# Patient Record
Sex: FEMALE | Race: BLACK OR AFRICAN AMERICAN | NOT HISPANIC OR LATINO | Employment: UNEMPLOYED | URBAN - METROPOLITAN AREA
[De-identification: names, ages, dates, MRNs, and addresses within clinical notes are randomized per-mention and may not be internally consistent; named-entity substitution may affect disease eponyms.]

---

## 2017-01-31 ENCOUNTER — HISTORICAL (OUTPATIENT)
Dept: INTERNAL MEDICINE | Facility: CLINIC | Age: 52
End: 2017-01-31

## 2017-05-08 ENCOUNTER — HISTORICAL (OUTPATIENT)
Dept: ADMINISTRATIVE | Facility: HOSPITAL | Age: 52
End: 2017-05-08

## 2017-08-22 ENCOUNTER — HISTORICAL (OUTPATIENT)
Dept: INTERNAL MEDICINE | Facility: CLINIC | Age: 52
End: 2017-08-22

## 2017-08-22 LAB
ABS NEUT (OLG): 1.89 X10(3)/MCL (ref 2.1–9.2)
ALBUMIN SERPL-MCNC: 3.8 GM/DL (ref 3.4–5)
ALBUMIN/GLOB SERPL: 1 RATIO (ref 1–2)
ALP SERPL-CCNC: 58 UNIT/L (ref 45–117)
ALT SERPL-CCNC: 32 UNIT/L (ref 12–78)
AST SERPL-CCNC: 21 UNIT/L (ref 15–37)
BASOPHILS # BLD AUTO: 0.02 X10(3)/MCL
BASOPHILS NFR BLD AUTO: 0 % (ref 0–1)
BILIRUB SERPL-MCNC: 0.3 MG/DL (ref 0.2–1)
BILIRUBIN DIRECT+TOT PNL SERPL-MCNC: 0.1 MG/DL
BILIRUBIN DIRECT+TOT PNL SERPL-MCNC: 0.2 MG/DL
BUN SERPL-MCNC: 9 MG/DL (ref 7–18)
CALCIUM SERPL-MCNC: 9.4 MG/DL (ref 8.5–10.1)
CHLORIDE SERPL-SCNC: 107 MMOL/L (ref 98–107)
CHOLEST SERPL-MCNC: 131 MG/DL
CHOLEST/HDLC SERPL: 2.6 {RATIO} (ref 0–4.4)
CO2 SERPL-SCNC: 31 MMOL/L (ref 21–32)
CREAT SERPL-MCNC: 0.8 MG/DL (ref 0.6–1.3)
EOSINOPHIL # BLD AUTO: 0.1 10*3/UL
EOSINOPHIL NFR BLD AUTO: 2 % (ref 0–5)
ERYTHROCYTE [DISTWIDTH] IN BLOOD BY AUTOMATED COUNT: 12.6 % (ref 11.5–14.5)
EST. AVERAGE GLUCOSE BLD GHB EST-MCNC: 146 MG/DL
GLOBULIN SER-MCNC: 2.9 GM/ML (ref 2.3–3.5)
GLUCOSE SERPL-MCNC: 142 MG/DL (ref 74–106)
HBA1C MFR BLD: 6.7 % (ref 4.2–6.3)
HCT VFR BLD AUTO: 39.8 % (ref 35–46)
HDLC SERPL-MCNC: 51 MG/DL
HGB BLD-MCNC: 13 GM/DL (ref 12–16)
LDLC SERPL CALC-MCNC: 68 MG/DL (ref 0–130)
LYMPHOCYTES # BLD AUTO: 1.81 X10(3)/MCL
LYMPHOCYTES NFR BLD AUTO: 44 % (ref 15–40)
MCH RBC QN AUTO: 29.7 PG (ref 26–34)
MCHC RBC AUTO-ENTMCNC: 32.7 GM/DL (ref 31–37)
MCV RBC AUTO: 90.9 FL (ref 80–100)
MONOCYTES # BLD AUTO: 0.32 X10(3)/MCL
MONOCYTES NFR BLD AUTO: 8 % (ref 4–12)
NEUTROPHILS # BLD AUTO: 1.89 X10(3)/MCL
NEUTROPHILS NFR BLD AUTO: 46 X10(3)/MCL
PLATELET # BLD AUTO: 150 X10(3)/MCL (ref 130–400)
PMV BLD AUTO: 11.1 FL (ref 7.4–10.4)
POTASSIUM SERPL-SCNC: 3.8 MMOL/L (ref 3.5–5.1)
PROT SERPL-MCNC: 6.7 GM/DL (ref 6.4–8.2)
RBC # BLD AUTO: 4.38 X10(6)/MCL (ref 4–5.2)
SODIUM SERPL-SCNC: 143 MMOL/L (ref 136–145)
TRIGL SERPL-MCNC: 61 MG/DL
TSH SERPL-ACNC: 1.4 MIU/L (ref 0.36–3.74)
VLDLC SERPL CALC-MCNC: 12 MG/DL
WBC # SPEC AUTO: 4.1 X10(3)/MCL (ref 4.5–11)

## 2018-02-26 ENCOUNTER — HISTORICAL (OUTPATIENT)
Dept: INTERNAL MEDICINE | Facility: CLINIC | Age: 53
End: 2018-02-26

## 2018-02-26 LAB
ABS NEUT (OLG): 2.31 X10(3)/MCL (ref 2.1–9.2)
ALBUMIN SERPL-MCNC: 3.7 GM/DL (ref 3.4–5)
ALBUMIN/GLOB SERPL: 1 RATIO (ref 1–2)
ALP SERPL-CCNC: 56 UNIT/L (ref 45–117)
ALT SERPL-CCNC: 27 UNIT/L (ref 12–78)
AST SERPL-CCNC: 24 UNIT/L (ref 15–37)
BASOPHILS # BLD AUTO: 0.02 X10(3)/MCL
BASOPHILS NFR BLD AUTO: 0 %
BILIRUB SERPL-MCNC: 0.4 MG/DL (ref 0.2–1)
BILIRUBIN DIRECT+TOT PNL SERPL-MCNC: 0.1 MG/DL
BILIRUBIN DIRECT+TOT PNL SERPL-MCNC: 0.3 MG/DL
BUN SERPL-MCNC: 12 MG/DL (ref 7–18)
CALCIUM SERPL-MCNC: 9.3 MG/DL (ref 8.5–10.1)
CHLORIDE SERPL-SCNC: 106 MMOL/L (ref 98–107)
CHOLEST SERPL-MCNC: 122 MG/DL
CHOLEST/HDLC SERPL: 2.7 {RATIO} (ref 0–4.4)
CO2 SERPL-SCNC: 32 MMOL/L (ref 21–32)
CREAT SERPL-MCNC: 0.8 MG/DL (ref 0.6–1.3)
EOSINOPHIL # BLD AUTO: 0.14 X10(3)/MCL
EOSINOPHIL NFR BLD AUTO: 3 %
ERYTHROCYTE [DISTWIDTH] IN BLOOD BY AUTOMATED COUNT: 12.4 % (ref 11.5–14.5)
EST. AVERAGE GLUCOSE BLD GHB EST-MCNC: 154 MG/DL
GLOBULIN SER-MCNC: 2.9 GM/ML (ref 2.3–3.5)
GLUCOSE SERPL-MCNC: 148 MG/DL (ref 74–106)
HBA1C MFR BLD: 7 % (ref 4.2–6.3)
HCT VFR BLD AUTO: 37.3 % (ref 35–46)
HDLC SERPL-MCNC: 45 MG/DL
HGB BLD-MCNC: 12.2 GM/DL (ref 12–16)
IMM GRANULOCYTES # BLD AUTO: 0.01 10*3/UL
IMM GRANULOCYTES NFR BLD AUTO: 0 %
LDLC SERPL CALC-MCNC: 69 MG/DL (ref 0–130)
LYMPHOCYTES # BLD AUTO: 2.2 X10(3)/MCL
LYMPHOCYTES NFR BLD AUTO: 44 % (ref 13–40)
MCH RBC QN AUTO: 29.6 PG (ref 26–34)
MCHC RBC AUTO-ENTMCNC: 32.7 GM/DL (ref 31–37)
MCV RBC AUTO: 90.5 FL (ref 80–100)
MONOCYTES # BLD AUTO: 0.38 X10(3)/MCL
MONOCYTES NFR BLD AUTO: 8 % (ref 4–12)
NEUTROPHILS # BLD AUTO: 2.31 X10(3)/MCL
NEUTROPHILS NFR BLD AUTO: 46 X10(3)/MCL
PLATELET # BLD AUTO: 162 X10(3)/MCL (ref 130–400)
PMV BLD AUTO: 11.6 FL (ref 7.4–10.4)
POTASSIUM SERPL-SCNC: 3.1 MMOL/L (ref 3.5–5.1)
PROT SERPL-MCNC: 6.6 GM/DL (ref 6.4–8.2)
RBC # BLD AUTO: 4.12 X10(6)/MCL (ref 4–5.2)
SODIUM SERPL-SCNC: 144 MMOL/L (ref 136–145)
TRIGL SERPL-MCNC: 40 MG/DL
TSH SERPL-ACNC: 1.2 MIU/L (ref 0.36–3.74)
VLDLC SERPL CALC-MCNC: 8 MG/DL
WBC # SPEC AUTO: 5.1 X10(3)/MCL (ref 4.5–11)

## 2018-03-05 ENCOUNTER — HISTORICAL (OUTPATIENT)
Dept: RADIOLOGY | Facility: HOSPITAL | Age: 53
End: 2018-03-05

## 2018-08-29 ENCOUNTER — HISTORICAL (OUTPATIENT)
Dept: INTERNAL MEDICINE | Facility: CLINIC | Age: 53
End: 2018-08-29

## 2018-08-29 LAB
ABS NEUT (OLG): 2.28 X10(3)/MCL (ref 2.1–9.2)
ALBUMIN SERPL-MCNC: 3.9 GM/DL (ref 3.4–5)
ALBUMIN/GLOB SERPL: 1 RATIO (ref 1–2)
ALP SERPL-CCNC: 56 UNIT/L (ref 45–117)
ALT SERPL-CCNC: 25 UNIT/L (ref 12–78)
AST SERPL-CCNC: 17 UNIT/L (ref 15–37)
BASOPHILS # BLD AUTO: 0.02 X10(3)/MCL
BASOPHILS NFR BLD AUTO: 0 %
BILIRUB SERPL-MCNC: 0.3 MG/DL (ref 0.2–1)
BILIRUBIN DIRECT+TOT PNL SERPL-MCNC: 0.1 MG/DL
BILIRUBIN DIRECT+TOT PNL SERPL-MCNC: 0.2 MG/DL
BUN SERPL-MCNC: 12 MG/DL (ref 7–18)
CALCIUM SERPL-MCNC: 9.3 MG/DL (ref 8.5–10.1)
CHLORIDE SERPL-SCNC: 106 MMOL/L (ref 98–107)
CHOLEST SERPL-MCNC: 132 MG/DL
CHOLEST/HDLC SERPL: 3 {RATIO} (ref 0–4.4)
CO2 SERPL-SCNC: 29 MMOL/L (ref 21–32)
CREAT SERPL-MCNC: 0.8 MG/DL (ref 0.6–1.3)
CREAT UR-MCNC: 266 MG/DL
EOSINOPHIL # BLD AUTO: 0.12 10*3/UL
EOSINOPHIL NFR BLD AUTO: 2 %
ERYTHROCYTE [DISTWIDTH] IN BLOOD BY AUTOMATED COUNT: 12.7 % (ref 11.5–14.5)
EST. AVERAGE GLUCOSE BLD GHB EST-MCNC: 140 MG/DL
GLOBULIN SER-MCNC: 3 GM/ML (ref 2.3–3.5)
GLUCOSE SERPL-MCNC: 144 MG/DL (ref 74–106)
HBA1C MFR BLD: 6.5 % (ref 4.2–6.3)
HCT VFR BLD AUTO: 40.5 % (ref 35–46)
HDLC SERPL-MCNC: 44 MG/DL
HGB BLD-MCNC: 13.4 GM/DL (ref 12–16)
IMM GRANULOCYTES # BLD AUTO: 0.01 10*3/UL
IMM GRANULOCYTES NFR BLD AUTO: 0 %
LDLC SERPL CALC-MCNC: 75 MG/DL (ref 0–130)
LYMPHOCYTES # BLD AUTO: 1.99 X10(3)/MCL
LYMPHOCYTES NFR BLD AUTO: 42 % (ref 13–40)
MCH RBC QN AUTO: 29.9 PG (ref 26–34)
MCHC RBC AUTO-ENTMCNC: 33.1 GM/DL (ref 31–37)
MCV RBC AUTO: 90.4 FL (ref 80–100)
MICROALBUMIN UR-MCNC: 14 MG/L (ref 0–19)
MICROALBUMIN/CREAT RATIO PNL UR: 5.3 MCG/MG CR (ref 0–29)
MONOCYTES # BLD AUTO: 0.34 X10(3)/MCL
MONOCYTES NFR BLD AUTO: 7 % (ref 4–12)
NEUTROPHILS # BLD AUTO: 2.28 X10(3)/MCL
NEUTROPHILS NFR BLD AUTO: 48 X10(3)/MCL
PLATELET # BLD AUTO: 154 X10(3)/MCL (ref 130–400)
PMV BLD AUTO: 11.6 FL (ref 7.4–10.4)
POTASSIUM SERPL-SCNC: 3.7 MMOL/L (ref 3.5–5.1)
PROT SERPL-MCNC: 6.9 GM/DL (ref 6.4–8.2)
RBC # BLD AUTO: 4.48 X10(6)/MCL (ref 4–5.2)
SODIUM SERPL-SCNC: 142 MMOL/L (ref 136–145)
TRIGL SERPL-MCNC: 63 MG/DL
TSH SERPL-ACNC: 1.95 MIU/L (ref 0.36–3.74)
VLDLC SERPL CALC-MCNC: 13 MG/DL
WBC # SPEC AUTO: 4.8 X10(3)/MCL (ref 4.5–11)

## 2019-03-26 ENCOUNTER — HISTORICAL (OUTPATIENT)
Dept: INTERNAL MEDICINE | Facility: CLINIC | Age: 54
End: 2019-03-26

## 2019-03-26 LAB
ABS NEUT (OLG): 3.16 X10(3)/MCL (ref 2.1–9.2)
ALBUMIN SERPL-MCNC: 4 GM/DL (ref 3.4–5)
ALBUMIN/GLOB SERPL: 1.3 RATIO (ref 1.1–2)
ALP SERPL-CCNC: 68 UNIT/L (ref 45–117)
ALT SERPL-CCNC: 28 UNIT/L (ref 12–78)
AST SERPL-CCNC: 16 UNIT/L (ref 15–37)
BASOPHILS # BLD AUTO: 0.02 X10(3)/MCL
BASOPHILS NFR BLD AUTO: 0 %
BILIRUB SERPL-MCNC: 0.4 MG/DL (ref 0.2–1)
BILIRUBIN DIRECT+TOT PNL SERPL-MCNC: 0.1 MG/DL
BILIRUBIN DIRECT+TOT PNL SERPL-MCNC: 0.3 MG/DL
BUN SERPL-MCNC: 10 MG/DL (ref 7–18)
CALCIUM SERPL-MCNC: 9.5 MG/DL (ref 8.5–10.1)
CHLORIDE SERPL-SCNC: 106 MMOL/L (ref 98–107)
CHOLEST SERPL-MCNC: 128 MG/DL
CHOLEST/HDLC SERPL: 3 {RATIO} (ref 0–4.4)
CO2 SERPL-SCNC: 27 MMOL/L (ref 21–32)
CREAT SERPL-MCNC: 0.8 MG/DL (ref 0.6–1.3)
EOSINOPHIL # BLD AUTO: 0.12 10*3/UL
EOSINOPHIL NFR BLD AUTO: 2 %
ERYTHROCYTE [DISTWIDTH] IN BLOOD BY AUTOMATED COUNT: 12.7 % (ref 11.5–14.5)
EST. AVERAGE GLUCOSE BLD GHB EST-MCNC: 151 MG/DL
GLOBULIN SER-MCNC: 3 GM/ML (ref 2.3–3.5)
GLUCOSE SERPL-MCNC: 139 MG/DL (ref 74–106)
HBA1C MFR BLD: 6.9 % (ref 4.2–6.3)
HCT VFR BLD AUTO: 41.1 % (ref 35–46)
HDLC SERPL-MCNC: 43 MG/DL
HGB BLD-MCNC: 13.3 GM/DL (ref 12–16)
IMM GRANULOCYTES # BLD AUTO: 0.01 10*3/UL
IMM GRANULOCYTES NFR BLD AUTO: 0 %
LDLC SERPL CALC-MCNC: 71 MG/DL (ref 0–130)
LYMPHOCYTES # BLD AUTO: 2.44 X10(3)/MCL
LYMPHOCYTES NFR BLD AUTO: 40 % (ref 13–40)
MCH RBC QN AUTO: 29.6 PG (ref 26–34)
MCHC RBC AUTO-ENTMCNC: 32.4 GM/DL (ref 31–37)
MCV RBC AUTO: 91.5 FL (ref 80–100)
MONOCYTES # BLD AUTO: 0.42 X10(3)/MCL
MONOCYTES NFR BLD AUTO: 7 % (ref 4–12)
NEUTROPHILS # BLD AUTO: 3.16 X10(3)/MCL
NEUTROPHILS NFR BLD AUTO: 51 X10(3)/MCL
PLATELET # BLD AUTO: 155 X10(3)/MCL (ref 130–400)
PMV BLD AUTO: 11.3 FL (ref 7.4–10.4)
POTASSIUM SERPL-SCNC: 3.5 MMOL/L (ref 3.5–5.1)
PROT SERPL-MCNC: 7 GM/DL (ref 6.4–8.2)
RBC # BLD AUTO: 4.49 X10(6)/MCL (ref 4–5.2)
SODIUM SERPL-SCNC: 141 MMOL/L (ref 136–145)
TRIGL SERPL-MCNC: 72 MG/DL
TSH SERPL-ACNC: 2.63 MIU/L (ref 0.36–3.74)
VLDLC SERPL CALC-MCNC: 14 MG/DL
WBC # SPEC AUTO: 6.2 X10(3)/MCL (ref 4.5–11)

## 2019-08-06 ENCOUNTER — HISTORICAL (OUTPATIENT)
Dept: RADIOLOGY | Facility: HOSPITAL | Age: 54
End: 2019-08-06

## 2019-10-29 ENCOUNTER — HISTORICAL (OUTPATIENT)
Dept: INTERNAL MEDICINE | Facility: CLINIC | Age: 54
End: 2019-10-29

## 2019-10-29 LAB
ABS NEUT (OLG): 2.07 X10(3)/MCL (ref 2.1–9.2)
ALBUMIN SERPL-MCNC: 3.7 GM/DL (ref 3.4–5)
ALBUMIN/GLOB SERPL: 1.3 RATIO (ref 1.1–2)
ALP SERPL-CCNC: 68 UNIT/L (ref 45–117)
ALT SERPL-CCNC: 18 UNIT/L (ref 12–78)
AST SERPL-CCNC: 13 UNIT/L (ref 15–37)
BASOPHILS # BLD AUTO: 0 X10(3)/MCL (ref 0–0.2)
BASOPHILS NFR BLD AUTO: 0 %
BILIRUB SERPL-MCNC: 0.3 MG/DL (ref 0.2–1)
BILIRUBIN DIRECT+TOT PNL SERPL-MCNC: 0.1 MG/DL (ref 0–0.2)
BILIRUBIN DIRECT+TOT PNL SERPL-MCNC: 0.2 MG/DL
BUN SERPL-MCNC: 11 MG/DL (ref 7–18)
CALCIUM SERPL-MCNC: 9.2 MG/DL (ref 8.5–10.1)
CHLORIDE SERPL-SCNC: 110 MMOL/L (ref 98–107)
CHOLEST SERPL-MCNC: 136 MG/DL
CHOLEST/HDLC SERPL: 2.7 {RATIO} (ref 0–4.4)
CO2 SERPL-SCNC: 29 MMOL/L (ref 21–32)
CREAT SERPL-MCNC: 0.9 MG/DL (ref 0.6–1.3)
ERYTHROCYTE [DISTWIDTH] IN BLOOD BY AUTOMATED COUNT: 12.7 % (ref 11.5–14.5)
EST. AVERAGE GLUCOSE BLD GHB EST-MCNC: 131 MG/DL
GLOBULIN SER-MCNC: 2.9 GM/ML (ref 2.3–3.5)
GLUCOSE SERPL-MCNC: 125 MG/DL (ref 74–106)
HBA1C MFR BLD: 6.2 % (ref 4.2–6.3)
HCT VFR BLD AUTO: 41.7 % (ref 35–46)
HDLC SERPL-MCNC: 51 MG/DL (ref 40–59)
HGB BLD-MCNC: 13.1 GM/DL (ref 12–16)
LDLC SERPL CALC-MCNC: 78 MG/DL
LYMPHOCYTES # BLD AUTO: 1.6 X10(3)/MCL (ref 0.6–4.6)
LYMPHOCYTES NFR BLD AUTO: 39 %
MCH RBC QN AUTO: 29.4 PG (ref 26–34)
MCHC RBC AUTO-ENTMCNC: 31.4 GM/DL (ref 31–37)
MCV RBC AUTO: 93.5 FL (ref 80–100)
MONOCYTES # BLD AUTO: 0.4 X10(3)/MCL (ref 0.1–1.3)
MONOCYTES NFR BLD AUTO: 10 %
NEUTROPHILS # BLD AUTO: 2.07 X10(3)/MCL (ref 2.1–9.2)
NEUTROPHILS NFR BLD AUTO: 51 %
PLATELET # BLD AUTO: 150 X10(3)/MCL (ref 130–400)
PMV BLD AUTO: 11.6 FL (ref 7.4–10.4)
POTASSIUM SERPL-SCNC: 3.9 MMOL/L (ref 3.5–5.1)
PROT SERPL-MCNC: 6.6 GM/DL (ref 6.4–8.2)
RBC # BLD AUTO: 4.46 X10(6)/MCL (ref 4–5.2)
SODIUM SERPL-SCNC: 145 MMOL/L (ref 136–145)
TRIGL SERPL-MCNC: 36 MG/DL
TSH SERPL-ACNC: 2.78 MIU/L (ref 0.36–3.74)
VLDLC SERPL CALC-MCNC: 7 MG/DL
WBC # SPEC AUTO: 4 X10(3)/MCL (ref 4.5–11)

## 2019-11-21 ENCOUNTER — HISTORICAL (OUTPATIENT)
Dept: INTERNAL MEDICINE | Facility: CLINIC | Age: 54
End: 2019-11-21

## 2020-05-18 ENCOUNTER — HISTORICAL (OUTPATIENT)
Dept: INTERNAL MEDICINE | Facility: CLINIC | Age: 55
End: 2020-05-18

## 2020-05-18 LAB
ABS NEUT (OLG): 2.25 X10(3)/MCL (ref 2.1–9.2)
ALBUMIN SERPL-MCNC: 3.7 GM/DL (ref 3.4–5)
ALBUMIN/GLOB SERPL: 1.2 RATIO (ref 1.1–2)
ALP SERPL-CCNC: 67 UNIT/L (ref 45–117)
ALT SERPL-CCNC: 31 UNIT/L (ref 12–78)
AST SERPL-CCNC: 18 UNIT/L (ref 15–37)
BASOPHILS # BLD AUTO: 0 X10(3)/MCL (ref 0–0.2)
BASOPHILS NFR BLD AUTO: 0 %
BILIRUB SERPL-MCNC: 0.3 MG/DL (ref 0.2–1)
BILIRUBIN DIRECT+TOT PNL SERPL-MCNC: 0.1 MG/DL (ref 0–0.2)
BILIRUBIN DIRECT+TOT PNL SERPL-MCNC: 0.2 MG/DL
BUN SERPL-MCNC: 13 MG/DL (ref 7–18)
CALCIUM SERPL-MCNC: 9.3 MG/DL (ref 8.5–10.1)
CHLORIDE SERPL-SCNC: 106 MMOL/L (ref 98–107)
CHOLEST SERPL-MCNC: 135 MG/DL
CHOLEST/HDLC SERPL: 2.4 {RATIO} (ref 0–4.4)
CO2 SERPL-SCNC: 31 MMOL/L (ref 21–32)
CREAT SERPL-MCNC: 0.8 MG/DL (ref 0.6–1.3)
EOSINOPHIL # BLD AUTO: 0.1 X10(3)/MCL (ref 0–0.9)
EOSINOPHIL NFR BLD AUTO: 3 %
ERYTHROCYTE [DISTWIDTH] IN BLOOD BY AUTOMATED COUNT: 13 % (ref 11.5–14.5)
EST. AVERAGE GLUCOSE BLD GHB EST-MCNC: 143 MG/DL
GLOBULIN SER-MCNC: 3.1 GM/ML (ref 2.3–3.5)
GLUCOSE SERPL-MCNC: 137 MG/DL (ref 74–106)
HBA1C MFR BLD: 6.6 % (ref 4.2–6.3)
HCT VFR BLD AUTO: 39.7 % (ref 35–46)
HDLC SERPL-MCNC: 57 MG/DL (ref 40–59)
HGB BLD-MCNC: 12.6 GM/DL (ref 12–16)
IMM GRANULOCYTES # BLD AUTO: 0.01 10*3/UL
IMM GRANULOCYTES NFR BLD AUTO: 0 %
LDLC SERPL CALC-MCNC: 65 MG/DL
LYMPHOCYTES # BLD AUTO: 2.4 X10(3)/MCL (ref 0.6–4.6)
LYMPHOCYTES NFR BLD AUTO: 46 %
MCH RBC QN AUTO: 29.4 PG (ref 26–34)
MCHC RBC AUTO-ENTMCNC: 31.7 GM/DL (ref 31–37)
MCV RBC AUTO: 92.5 FL (ref 80–100)
MONOCYTES # BLD AUTO: 0.4 X10(3)/MCL (ref 0.1–1.3)
MONOCYTES NFR BLD AUTO: 8 %
NEUTROPHILS # BLD AUTO: 2.25 X10(3)/MCL (ref 2.1–9.2)
NEUTROPHILS NFR BLD AUTO: 43 %
PLATELET # BLD AUTO: 154 X10(3)/MCL (ref 130–400)
PMV BLD AUTO: 11.2 FL (ref 7.4–10.4)
POTASSIUM SERPL-SCNC: 3.7 MMOL/L (ref 3.5–5.1)
PROT SERPL-MCNC: 6.8 GM/DL (ref 6.4–8.2)
RBC # BLD AUTO: 4.29 X10(6)/MCL (ref 4–5.2)
SODIUM SERPL-SCNC: 142 MMOL/L (ref 136–145)
TRIGL SERPL-MCNC: 67 MG/DL
TSH SERPL-ACNC: 2.54 MIU/L (ref 0.36–3.74)
VLDLC SERPL CALC-MCNC: 13 MG/DL
WBC # SPEC AUTO: 5.3 X10(3)/MCL (ref 4.5–11)

## 2020-10-22 ENCOUNTER — HISTORICAL (OUTPATIENT)
Dept: INTERNAL MEDICINE | Facility: CLINIC | Age: 55
End: 2020-10-22

## 2020-10-22 LAB
ABS NEUT (OLG): 2.06 X10(3)/MCL (ref 2.1–9.2)
ALBUMIN SERPL-MCNC: 4.3 GM/DL (ref 3.5–5)
ALBUMIN/GLOB SERPL: 1.8 RATIO (ref 1.1–2)
ALP SERPL-CCNC: 44 UNIT/L (ref 40–150)
ALT SERPL-CCNC: 26 UNIT/L (ref 0–55)
APPEARANCE, UA: CLEAR
AST SERPL-CCNC: 28 UNIT/L (ref 5–34)
BACTERIA #/AREA URNS AUTO: ABNORMAL /HPF
BASOPHILS # BLD AUTO: 0 X10(3)/MCL (ref 0–0.2)
BASOPHILS NFR BLD AUTO: 0 %
BILIRUB SERPL-MCNC: 0.6 MG/DL
BILIRUB UR QL STRIP: NEGATIVE
BILIRUBIN DIRECT+TOT PNL SERPL-MCNC: 0.2 MG/DL (ref 0–0.5)
BILIRUBIN DIRECT+TOT PNL SERPL-MCNC: 0.4 MG/DL (ref 0–0.8)
BUN SERPL-MCNC: 14 MG/DL (ref 9.8–20.1)
CALCIUM SERPL-MCNC: 10.2 MG/DL (ref 8.4–10.2)
CHLORIDE SERPL-SCNC: 105 MMOL/L (ref 98–107)
CHOLEST SERPL-MCNC: 140 MG/DL
CHOLEST/HDLC SERPL: 3 {RATIO} (ref 0–5)
CO2 SERPL-SCNC: 30 MMOL/L (ref 22–29)
COLOR UR: YELLOW
CREAT SERPL-MCNC: 0.77 MG/DL (ref 0.55–1.02)
CREAT UR-MCNC: 255.9 MG/DL (ref 45–106)
EOSINOPHIL # BLD AUTO: 0.2 X10(3)/MCL (ref 0–0.9)
EOSINOPHIL NFR BLD AUTO: 4 %
ERYTHROCYTE [DISTWIDTH] IN BLOOD BY AUTOMATED COUNT: 12.9 % (ref 11.5–14.5)
EST. AVERAGE GLUCOSE BLD GHB EST-MCNC: 128.4 MG/DL
GLOBULIN SER-MCNC: 2.4 GM/DL (ref 2.4–3.5)
GLUCOSE (UA): NEGATIVE
GLUCOSE SERPL-MCNC: 111 MG/DL (ref 74–100)
HBA1C MFR BLD: 6.1 %
HCT VFR BLD AUTO: 37.7 % (ref 35–46)
HDLC SERPL-MCNC: 52 MG/DL (ref 35–60)
HGB BLD-MCNC: 12.1 GM/DL (ref 12–16)
HGB UR QL STRIP: NEGATIVE
HYALINE CASTS #/AREA URNS LPF: ABNORMAL /LPF
IMM GRANULOCYTES # BLD AUTO: 0.01 10*3/UL
IMM GRANULOCYTES NFR BLD AUTO: 0 %
KETONES UR QL STRIP: ABNORMAL
LDLC SERPL CALC-MCNC: 77 MG/DL (ref 50–140)
LEUKOCYTE ESTERASE UR QL STRIP: NEGATIVE
LYMPHOCYTES # BLD AUTO: 2.2 X10(3)/MCL (ref 0.6–4.6)
LYMPHOCYTES NFR BLD AUTO: 44 %
MCH RBC QN AUTO: 30 PG (ref 26–34)
MCHC RBC AUTO-ENTMCNC: 32.1 GM/DL (ref 31–37)
MCV RBC AUTO: 93.3 FL (ref 80–100)
MICROALBUMIN UR-MCNC: 11.3 UG/ML
MICROALBUMIN/CREAT RATIO PNL UR: 44.2 MG/GM CR (ref 0–30)
MONOCYTES # BLD AUTO: 0.5 X10(3)/MCL (ref 0.1–1.3)
MONOCYTES NFR BLD AUTO: 10 %
NEUTROPHILS # BLD AUTO: 2.06 X10(3)/MCL (ref 2.1–9.2)
NEUTROPHILS NFR BLD AUTO: 41 %
NITRITE UR QL STRIP: NEGATIVE
PH UR STRIP: 6 [PH] (ref 4.5–8)
PLATELET # BLD AUTO: 152 X10(3)/MCL (ref 130–400)
PMV BLD AUTO: 11.8 FL (ref 7.4–10.4)
POTASSIUM SERPL-SCNC: 3.7 MMOL/L (ref 3.5–5.1)
PROT SERPL-MCNC: 6.7 GM/DL (ref 6.4–8.3)
PROT UR QL STRIP: 20 MG/DL
RBC # BLD AUTO: 4.04 X10(6)/MCL (ref 4–5.2)
RBC #/AREA URNS AUTO: ABNORMAL /HPF
SODIUM SERPL-SCNC: 143 MMOL/L (ref 136–145)
SP GR UR STRIP: 1.03 (ref 1–1.03)
SQUAMOUS #/AREA URNS LPF: ABNORMAL /LPF
T4 FREE SERPL-MCNC: 0.98 NG/DL (ref 0.7–1.48)
TRIGL SERPL-MCNC: 54 MG/DL (ref 37–140)
TSH SERPL-ACNC: 1.82 UIU/ML (ref 0.35–4.94)
UROBILINOGEN UR STRIP-ACNC: 2 MG/DL
VLDLC SERPL CALC-MCNC: 11 MG/DL
WBC # SPEC AUTO: 5 X10(3)/MCL (ref 4.5–11)
WBC #/AREA URNS AUTO: ABNORMAL /HPF

## 2021-03-10 ENCOUNTER — HISTORICAL (OUTPATIENT)
Dept: RADIOLOGY | Facility: HOSPITAL | Age: 56
End: 2021-03-10

## 2021-04-21 ENCOUNTER — HISTORICAL (OUTPATIENT)
Dept: INTERNAL MEDICINE | Facility: CLINIC | Age: 56
End: 2021-04-21

## 2021-04-21 LAB
ABS NEUT (OLG): 1.85 X10(3)/MCL (ref 2.1–9.2)
ALBUMIN SERPL-MCNC: 4.1 GM/DL (ref 3.5–5)
ALBUMIN/GLOB SERPL: 1.7 RATIO (ref 1.1–2)
ALP SERPL-CCNC: 55 UNIT/L (ref 40–150)
ALT SERPL-CCNC: 23 UNIT/L (ref 0–55)
AST SERPL-CCNC: 17 UNIT/L (ref 5–34)
BASOPHILS NFR BLD MANUAL: 0 %
BILIRUB SERPL-MCNC: 0.5 MG/DL
BILIRUBIN DIRECT+TOT PNL SERPL-MCNC: 0.2 MG/DL (ref 0–0.5)
BILIRUBIN DIRECT+TOT PNL SERPL-MCNC: 0.3 MG/DL (ref 0–0.8)
BUN SERPL-MCNC: 12.8 MG/DL (ref 9.8–20.1)
CALCIUM SERPL-MCNC: 10 MG/DL (ref 8.4–10.2)
CHLORIDE SERPL-SCNC: 106 MMOL/L (ref 98–107)
CHOLEST SERPL-MCNC: 119 MG/DL
CHOLEST/HDLC SERPL: 3 {RATIO} (ref 0–5)
CO2 SERPL-SCNC: 28 MMOL/L (ref 22–29)
CREAT SERPL-MCNC: 0.74 MG/DL (ref 0.55–1.02)
EOSINOPHIL NFR BLD MANUAL: 4 %
ERYTHROCYTE [DISTWIDTH] IN BLOOD BY AUTOMATED COUNT: 12.5 % (ref 11.5–14.5)
EST. AVERAGE GLUCOSE BLD GHB EST-MCNC: 122.6 MG/DL
GLOBULIN SER-MCNC: 2.4 GM/DL (ref 2.4–3.5)
GLUCOSE SERPL-MCNC: 128 MG/DL (ref 74–100)
GRANULOCYTES NFR BLD MANUAL: 43 % (ref 43–75)
HBA1C MFR BLD: 5.9 %
HCT VFR BLD AUTO: 40.4 % (ref 35–46)
HDLC SERPL-MCNC: 43 MG/DL (ref 35–60)
HGB BLD-MCNC: 12.6 GM/DL (ref 12–16)
LDLC SERPL CALC-MCNC: 65 MG/DL (ref 50–140)
LYMPHOCYTES NFR BLD MANUAL: 48 % (ref 20.5–51.1)
MCH RBC QN AUTO: 29.3 PG (ref 26–34)
MCHC RBC AUTO-ENTMCNC: 31.2 GM/DL (ref 31–37)
MCV RBC AUTO: 94 FL (ref 80–100)
MONOCYTES NFR BLD MANUAL: 5 % (ref 2–9)
PLATELET # BLD AUTO: 181 X10(3)/MCL (ref 130–400)
PLATELET # BLD EST: ADEQUATE 10*3/UL
PMV BLD AUTO: 10.7 FL (ref 7.4–10.4)
POTASSIUM SERPL-SCNC: 4.2 MMOL/L (ref 3.5–5.1)
PROT SERPL-MCNC: 6.5 GM/DL (ref 6.4–8.3)
RBC # BLD AUTO: 4.3 X10(6)/MCL (ref 4–5.2)
RBC MORPH BLD: NORMAL
SODIUM SERPL-SCNC: 141 MMOL/L (ref 136–145)
T4 FREE SERPL-MCNC: 0.97 NG/DL (ref 0.7–1.48)
TRIGL SERPL-MCNC: 53 MG/DL (ref 37–140)
TSH SERPL-ACNC: 1.75 UIU/ML (ref 0.35–4.94)
VLDLC SERPL CALC-MCNC: 11 MG/DL
WBC # SPEC AUTO: 5 X10(3)/MCL (ref 4.5–11)

## 2021-10-21 ENCOUNTER — HISTORICAL (OUTPATIENT)
Dept: INTERNAL MEDICINE | Facility: CLINIC | Age: 56
End: 2021-10-21

## 2021-10-21 LAB
ABS NEUT (OLG): 1.98 X10(3)/MCL (ref 2.1–9.2)
ALBUMIN SERPL-MCNC: 4 GM/DL (ref 3.5–5)
ALBUMIN/GLOB SERPL: 1.7 RATIO (ref 1.1–2)
ALP SERPL-CCNC: 50 UNIT/L (ref 40–150)
ALT SERPL-CCNC: 18 UNIT/L (ref 0–55)
AST SERPL-CCNC: 19 UNIT/L (ref 5–34)
BASOPHILS # BLD AUTO: 0 X10(3)/MCL (ref 0–0.2)
BASOPHILS NFR BLD AUTO: 1 %
BILIRUB SERPL-MCNC: 0.4 MG/DL
BILIRUBIN DIRECT+TOT PNL SERPL-MCNC: 0.2 MG/DL (ref 0–0.5)
BILIRUBIN DIRECT+TOT PNL SERPL-MCNC: 0.2 MG/DL (ref 0–0.8)
BUN SERPL-MCNC: 12.9 MG/DL (ref 9.8–20.1)
CALCIUM SERPL-MCNC: 10.4 MG/DL (ref 8.4–10.2)
CHLORIDE SERPL-SCNC: 105 MMOL/L (ref 98–107)
CHOLEST SERPL-MCNC: 130 MG/DL
CHOLEST/HDLC SERPL: 3 {RATIO} (ref 0–5)
CO2 SERPL-SCNC: 26 MMOL/L (ref 22–29)
CREAT SERPL-MCNC: 0.76 MG/DL (ref 0.55–1.02)
EOSINOPHIL # BLD AUTO: 0.1 X10(3)/MCL (ref 0–0.9)
EOSINOPHIL NFR BLD AUTO: 2 %
ERYTHROCYTE [DISTWIDTH] IN BLOOD BY AUTOMATED COUNT: 12.9 % (ref 11.5–14.5)
GLOBULIN SER-MCNC: 2.3 GM/DL (ref 2.4–3.5)
GLUCOSE SERPL-MCNC: 124 MG/DL (ref 74–100)
HCT VFR BLD AUTO: 38.8 % (ref 35–46)
HDLC SERPL-MCNC: 48 MG/DL (ref 35–60)
HGB BLD-MCNC: 12.5 GM/DL (ref 12–16)
LDLC SERPL CALC-MCNC: 73 MG/DL (ref 50–140)
LYMPHOCYTES # BLD AUTO: 2.1 X10(3)/MCL (ref 0.6–4.6)
LYMPHOCYTES NFR BLD AUTO: 46 %
MCH RBC QN AUTO: 30 PG (ref 26–34)
MCHC RBC AUTO-ENTMCNC: 32.2 GM/DL (ref 31–37)
MCV RBC AUTO: 93.3 FL (ref 80–100)
MONOCYTES # BLD AUTO: 0.4 X10(3)/MCL (ref 0.1–1.3)
MONOCYTES NFR BLD AUTO: 9 %
NEUTROPHILS # BLD AUTO: 1.98 X10(3)/MCL (ref 2.1–9.2)
NEUTROPHILS NFR BLD AUTO: 43 %
NRBC BLD AUTO-RTO: 0 % (ref 0–0.2)
PLATELET # BLD AUTO: 158 X10(3)/MCL (ref 130–400)
PMV BLD AUTO: 12 FL (ref 7.4–10.4)
POTASSIUM SERPL-SCNC: 3.8 MMOL/L (ref 3.5–5.1)
PROT SERPL-MCNC: 6.3 GM/DL (ref 6.4–8.3)
RBC # BLD AUTO: 4.16 X10(6)/MCL (ref 4–5.2)
SODIUM SERPL-SCNC: 140 MMOL/L (ref 136–145)
T4 FREE SERPL-MCNC: 0.99 NG/DL (ref 0.7–1.48)
TRIGL SERPL-MCNC: 44 MG/DL (ref 37–140)
TSH SERPL-ACNC: 1.51 UIU/ML (ref 0.35–4.94)
VLDLC SERPL CALC-MCNC: 9 MG/DL
WBC # SPEC AUTO: 4.6 X10(3)/MCL (ref 4.5–11)

## 2022-03-08 ENCOUNTER — HISTORICAL (OUTPATIENT)
Dept: ADMINISTRATIVE | Facility: HOSPITAL | Age: 57
End: 2022-03-08

## 2022-04-10 ENCOUNTER — HISTORICAL (OUTPATIENT)
Dept: ADMINISTRATIVE | Facility: HOSPITAL | Age: 57
End: 2022-04-10
Payer: OTHER GOVERNMENT

## 2022-04-27 VITALS
DIASTOLIC BLOOD PRESSURE: 80 MMHG | WEIGHT: 155.63 LBS | OXYGEN SATURATION: 99 % | HEIGHT: 66 IN | BODY MASS INDEX: 25.01 KG/M2 | SYSTOLIC BLOOD PRESSURE: 138 MMHG

## 2022-04-30 NOTE — PROGRESS NOTES
"   Patient:   Maria Luisa Boyle            MRN: [id number]            FIN: [id number]               Age:   51 years     Sex:  Female     :  1965   Associated Diagnoses:   Primary osteoarthritis of right knee; Tobacco abuse counseling   Author:   Tahir Maddox NP      Chief Complaint   2017 13:45 CDT       RIGHT KNEE ARTHRITIS, C/O PAIN 10/10        History of Present Illness   52 yo AA female presents to ortho clinic for c/o right knee pain.  Reports that the pain to right knee started "several months ago" but denies any known injury or trauma.  Admits that standing or walking for long periods of time makes the pain worse.  Denies any numbness/tingling to right LE.  Denies using any assistive devices for ambulation assistance but admits walking with a limp.  Admits taking prescribed medication as needed for pain with moderate relief.  Current daily smoker.  No other complaints today.      Review of Systems   ROS reviewed as documented in chart      Health Status   Allergies:    Allergic Reactions (Selected)  Severity Not Documented  No Known Allergies- No reactions were documented.,    Allergies (1) Active Reaction  No Known Allergies None Documented     Current medications:  (Selected)   Prescriptions  Prescribed  Flexeril 5 mg oral tablet: 5 mg = 1 tab(s), Oral, TID, # 90 tab(s), 1 Refill(s), Pharmacy: [facility]  atenolol 25 mg oral tablet: 25 mg = 1 tab(s), Oral, Daily, # 90 tab(s), 3 Refill(s), Pharmacy: [facility]  hydrochlorothiazide 25 mg oral tablet: 25 mg = 1 tab(s), Oral, Daily, # 90 tab(s), 3 Refill(s), Pharmacy: [facility]  ibuprofen 800 mg oral tablet: 800 mg = 1 tab(s), Oral, TID, with food or milk, # 90 tab(s), 5 Refill(s), Pharmacy: [facility]  losartan 50 mg oral tablet: 100 mg = 2 tab(s), Oral, Daily, # 180 tab(s), 3 Refill(s), Pharmacy: [facility]  metformin 500 mg oral tablet: 500 mg = 1 tab(s), Oral, BID, # 180 tab(s), 3 " Refill(s), Pharmacy: [facility]  simvastatin 20 mg oral tablet: 20 mg = 1 tab(s), Oral, Once a day (at bedtime), # 90 tab(s), 3 Refill(s), Pharmacy: [facility]  Documented Medications  Documented  CeleBREX 200 mg oral capsule: 200 mg = 1 cap(s), Oral, Daily  topiramate 100 mg oral tablet: 100 mg = 1 tab(s), Oral, Daily,    Home Medications (9) Active  atenolol 25 mg oral tablet 25 mg = 1 tab(s), Oral, Daily  CeleBREX 200 mg oral capsule 200 mg = 1 cap(s), Oral, Daily  Flexeril 5 mg oral tablet 5 mg = 1 tab(s), Oral, TID  hydrochlorothiazide 25 mg oral tablet 25 mg = 1 tab(s), Oral, Daily  ibuprofen 800 mg oral tablet 800 mg = 1 tab(s), Oral, TID  losartan 50 mg oral tablet 100 mg = 2 tab(s), Oral, Daily  metformin 500 mg oral tablet 500 mg = 1 tab(s), Oral, BID  simvastatin 20 mg oral tablet 20 mg = 1 tab(s), Oral, Once a day (at bedtime)  topiramate 100 mg oral tablet 100 mg = 1 tab(s), Oral, Daily  ,    No qualifying data available     Problem list:    All Problems  Diabetes / SNOMED CT [id number] / Confirmed  DM - Diabetes mellitus / SNOMED CT 860322749 / Confirmed  HTN - Hypertension / SNOMED CT 7119369978 / Confirmed  Hypertension / SNOMED CT [id number] / Confirmed,    Active Problems (4)  Diabetes   DM - Diabetes mellitus   HTN - Hypertension   Hypertension         Histories   Past Medical History:    Active  HTN - Hypertension (2073303875)  DM - Diabetes mellitus (236893819)   Family History:    Larynx  Father  Diabetes mellitus type 2  Mother  Hypertension.  Mother     Procedure history:    Ulnar ([id number]) in 2011 at 45 Years.  Hysterectomy (611996892) in 2009 at 43 Years.  Tubal ligation (418461699) in 2001 at 35 Years.   Social History        Social & Psychosocial Habits    Alcohol  04/15/2015  Type: denies alcohol use    Employment/School  04/15/2015  Status: Unemployed    Exercise  04/15/2015  Times per week: Daily    Exercise  type: Walking    Home/Environment  04/15/2015  Lives with: Children, Spouse    Alcohol abuse in household: No    Substance abuse in household: No    Smoker in household: No    Injuries/Abuse/Neglect in household: No    Safe place to go: Yes    Nutrition/Health  04/15/2015  Type of diet: Regular    Other    Comment: no falls in the last three months - 04/15/2015 15:10 - Christian SANDSTere Sofia    Sexual    Comment: confidential - 04/15/2015 15:09 - Tere Gonzales LPN    Substance Abuse  04/15/2015  Type: Prescription medications    Tobacco  04/15/2015  Type: Cigarettes    Tobacco use per day: 10  .        Physical Examination   Vital Signs   5/8/2017 13:45 CDT       Peripheral Pulse Rate     76 bpm                             Systolic Blood Pressure   120 mmHg                             Diastolic Blood Pressure  76 mmHg     Measurements from flowsheet : Measurements   5/8/2017 13:45 CDT       Weight Dosing             79.37 kg                             Weight Measured and Calculated in Lbs     174.98 lb                             Height/Length Dosing      167 cm                             Height/Length Measured    167 cm     General:  Alert and oriented.    HENT:  Normocephalic.    Neck:  Supple.    Integumentary:  Warm, Dry, Pink.    Neurologic:  No focal deficits.    Cognition and Speech:  Speech clear and coherent.    Psychiatric:  Appropriate mood & affect.    Right knee exam:  Good ROM with mild pain associated with movement.  Mild TTP to lateral joint line and patellar tendon.  No effusion noted.  NV intact.  +2 pedal pulse.  Extension = 0.  Flexion = 130.  Moderate quad deconditioning noted.       Health Maintenance      Health Maintenance     Pending (in the next year)        OverDue           Diabetes Maintenance-Urine Dipstick due  05/05/17  and every 1  year(s)        Due            Alcohol Misuse Screening due  05/08/17  and every 1  year(s)           Cervical Cancer Screening due   05/08/17  and every 5  year(s)           Diabetes Maintenance-Eye Exam due  05/08/17  Variable frequency           Diabetes Maintenance-Foot Exam due  05/08/17  Variable frequency           Diabetes Maintenance-Microalbumin due  05/08/17  Variable frequency           HIV Screening due  05/08/17  and every 1  year(s)           Hypertension Management-Education due  05/08/17  and every 1  year(s)           Tetanus Vaccine due  05/08/17  and every 10  year(s)        Due In Future            Diabetes Maintenance-HgbA1c not due until  07/31/17  and every 6  month(s)           Influenza Vaccine not due until  10/02/17  and every 1  year(s)           Hypertension Management-Blood Pressure not due until  11/08/17  and every 6  month(s)           Diabetes Maintenance-Fasting Lipid Profile not due until  01/31/18  and every 1  year(s)           Diabetes Maintenance-Serum Creatinine not due until  01/31/18  and every 1  year(s)           Hypertension Management-BMP not due until  01/31/18  and every 1  year(s)           Lipid Screening not due until  01/31/18  and every 1  year(s)           Body Mass Index Check not due until  03/07/18  and every 1  year(s)           Depression Screening not due until  03/07/18  and every 1  year(s)           Obesity Screening not due until  03/07/18  and every 1  year(s)     Satisfied (in the past 1 year)        Satisfied            Blood Pressure Screening on  05/08/17.  Satisfied by Kathia Jackson MA           Body Mass Index Check on  03/07/17.  Satisfied by Grisel Judd LPN           Breast Cancer Screening on  12/20/16.  Satisfied by Coby Guerrero           Depression Screening on  03/07/17.  Satisfied by Grisel Judd LPN           Diabetes Maintenance-Fasting Lipid Profile on  01/31/17.  Satisfied by Oliverio Palacios Jr.           Diabetes Maintenance-HgbA1c on  03/07/17.  Satisfied by Julita Chacon MD           Diabetes Maintenance-Serum Creatinine on   01/31/17.  Satisfied by Oliverio Palacios Jr.           Hypertension Management-Blood Pressure on  05/08/17.  Satisfied by Kathia Jackson MA           Hypertension Management-BMP on  01/31/17.  Satisfied by Oliverio Palacios Jr.           Lipid Screening on  03/07/17.  Satisfied by Julita Chacon MD           Obesity Screening on  03/07/17.  Satisfied by Grisel Judd LPN           Smoking Cessation on  05/08/17.  Satisfied by Kathia Jackson MA           Tobacco Use Screening on  05/08/17.  Satisfied by Kathia Jackson MA          Review / Management   Results review:     No qualifying data available.      Right knee x-rays (5/8/17):    Radiology Report  Clinical History:  Pain     Technique:  4 views the right knee     Comparison:  X-ray dated February 17, 2017     Findings:  There is no acute fracture, subluxation or dislocation.  Joints and interspaces appear maintained.  Osseous structures show normal bone mineral density.  Soft tissues are unremarkable.  Mild degenerative change of the fibular head with osteophytes.     Impression:  No acute osseous abnormality, fracture, or dislocation.         Impression and Plan   Diagnosis     Primary osteoarthritis of right knee (YIC02-SC M17.11).     Tobacco abuse counseling (ZJR12-ET Z71.6).       Plan:  1.  ROM/strengthening exercises to right LE (printed instructions given).  3.  Continue prescribed medication as needed for pain relief.  3.  Ice compresses and activity modifications as needed for symptom relief.  4.  Discussed risks of smoking and pt. does not want to quit at this time.  5.  Pt. denies wanting to try a knee injection today.  6.  RTC PRN.   Patient Instructions:  Osteoarthritis, Smoking Cessation, Tips for Success.

## 2022-05-26 ENCOUNTER — OFFICE VISIT (OUTPATIENT)
Dept: INTERNAL MEDICINE | Facility: CLINIC | Age: 57
End: 2022-05-26
Payer: OTHER GOVERNMENT

## 2022-05-26 VITALS
HEART RATE: 59 BPM | TEMPERATURE: 99 F | DIASTOLIC BLOOD PRESSURE: 65 MMHG | SYSTOLIC BLOOD PRESSURE: 100 MMHG | BODY MASS INDEX: 22.82 KG/M2 | RESPIRATION RATE: 18 BRPM | HEIGHT: 66 IN | WEIGHT: 142 LBS

## 2022-05-26 DIAGNOSIS — I10 PRIMARY HYPERTENSION: Primary | Chronic | ICD-10-CM

## 2022-05-26 DIAGNOSIS — E11.9 TYPE 2 DIABETES MELLITUS WITHOUT COMPLICATION, WITHOUT LONG-TERM CURRENT USE OF INSULIN: Chronic | ICD-10-CM

## 2022-05-26 DIAGNOSIS — E78.49 OTHER HYPERLIPIDEMIA: Chronic | ICD-10-CM

## 2022-05-26 DIAGNOSIS — Z72.0 TOBACCO USER: Chronic | ICD-10-CM

## 2022-05-26 PROBLEM — E78.5 HYPERLIPIDEMIA: Chronic | Status: ACTIVE | Noted: 2022-05-26

## 2022-05-26 PROBLEM — E78.5 HYPERLIPIDEMIA: Status: ACTIVE | Noted: 2022-05-26

## 2022-05-26 PROCEDURE — 99214 OFFICE O/P EST MOD 30 MIN: CPT | Mod: S$PBB,,, | Performed by: NURSE PRACTITIONER

## 2022-05-26 PROCEDURE — 99214 PR OFFICE/OUTPT VISIT, EST, LEVL IV, 30-39 MIN: ICD-10-PCS | Mod: S$PBB,,, | Performed by: NURSE PRACTITIONER

## 2022-05-26 PROCEDURE — 99215 OFFICE O/P EST HI 40 MIN: CPT | Mod: PBBFAC | Performed by: NURSE PRACTITIONER

## 2022-05-26 RX ORDER — KETOCONAZOLE 20 MG/G
CREAM TOPICAL DAILY
Qty: 30 G | Refills: 2 | Status: SHIPPED | OUTPATIENT
Start: 2022-05-26 | End: 2023-07-25

## 2022-05-26 RX ORDER — POTASSIUM CHLORIDE 20 MEQ/1
1 TABLET, EXTENDED RELEASE ORAL DAILY
COMMUNITY
Start: 2021-07-06 | End: 2022-05-26 | Stop reason: SDUPTHER

## 2022-05-26 RX ORDER — SIMVASTATIN 20 MG/1
20 TABLET, FILM COATED ORAL NIGHTLY
COMMUNITY
End: 2022-05-26 | Stop reason: SDUPTHER

## 2022-05-26 RX ORDER — IBUPROFEN 800 MG/1
800 TABLET ORAL 3 TIMES DAILY
Qty: 270 TABLET | Refills: 2 | Status: SHIPPED | OUTPATIENT
Start: 2022-05-26 | End: 2023-01-18 | Stop reason: SDUPTHER

## 2022-05-26 RX ORDER — IBUPROFEN 800 MG/1
800 TABLET ORAL 3 TIMES DAILY
COMMUNITY
Start: 2021-07-21 | End: 2022-05-26 | Stop reason: SDUPTHER

## 2022-05-26 RX ORDER — SIMVASTATIN 20 MG/1
20 TABLET, FILM COATED ORAL NIGHTLY
Qty: 90 TABLET | Refills: 2 | Status: SHIPPED | OUTPATIENT
Start: 2022-05-26 | End: 2022-11-22 | Stop reason: SDUPTHER

## 2022-05-26 RX ORDER — ATENOLOL 25 MG/1
25 TABLET ORAL DAILY
COMMUNITY
Start: 2021-11-19 | End: 2022-11-22 | Stop reason: SDUPTHER

## 2022-05-26 RX ORDER — METFORMIN HYDROCHLORIDE 500 MG/1
1 TABLET ORAL 2 TIMES DAILY
COMMUNITY
Start: 2021-12-23 | End: 2022-11-22 | Stop reason: SDUPTHER

## 2022-05-26 RX ORDER — LOSARTAN POTASSIUM 50 MG/1
50 TABLET ORAL DAILY
COMMUNITY
End: 2022-05-26 | Stop reason: SDUPTHER

## 2022-05-26 RX ORDER — POTASSIUM CHLORIDE 20 MEQ/1
20 TABLET, EXTENDED RELEASE ORAL DAILY
Qty: 90 TABLET | Refills: 2 | Status: SHIPPED | OUTPATIENT
Start: 2022-05-26 | End: 2022-11-22 | Stop reason: SDUPTHER

## 2022-05-26 RX ORDER — HYDROCHLOROTHIAZIDE 25 MG/1
1 TABLET ORAL DAILY
COMMUNITY
Start: 2022-03-08 | End: 2022-11-22 | Stop reason: SDUPTHER

## 2022-05-26 RX ORDER — LOSARTAN POTASSIUM 50 MG/1
50 TABLET ORAL DAILY
Qty: 90 TABLET | Refills: 2 | Status: SHIPPED | OUTPATIENT
Start: 2022-05-26 | End: 2022-11-22 | Stop reason: SDUPTHER

## 2022-05-26 RX ORDER — OMEPRAZOLE 40 MG/1
40 CAPSULE, DELAYED RELEASE ORAL DAILY
COMMUNITY
Start: 2022-03-07 | End: 2022-10-05 | Stop reason: SDUPTHER

## 2022-05-26 NOTE — ASSESSMENT & PLAN NOTE
Continue Simvastatin 20 mg daily  Weight loss encouraged  Low fat/high fiber diet  Increase physical activity  Tobacco cessation encouraged  Chol: 130  HDL: 48  Tri  LDL: 73

## 2022-05-26 NOTE — ASSESSMENT & PLAN NOTE
Smoking cessation education provided, encouraged. Patient refused smoking cessation at this time.

## 2022-05-26 NOTE — ASSESSMENT & PLAN NOTE
Continue Metformin 500 mg bid  Continue home CBG monitoring.  Hypoglycemic episodes: denies  BMI: 23.09  HgbA1c: 5.9  UA Creatinine: ordered  UA Microalbumin: ordered  On Simvastatin  Weight Loss Encouraged  ADA Diet    Patient would benefit from excess skin removal from abdomen due to frequent rashes to abdominal skin folds with diagnosis of diabetes mellitus.

## 2022-05-26 NOTE — ASSESSMENT & PLAN NOTE
B/P: 100/65  UA Creatinine: ordered  UA Microalbumin: ordered  EKG:   Continue Atenolol 25 mg daily, HCTZ 25 mg daily, Losartan 50 mg daily  DASH diet  Continue home blood pressure monitoring

## 2022-05-26 NOTE — PROGRESS NOTES
Subjective:       Patient ID: Maria Luisa Boyle is a 56 y.o. female.    Chief Complaint: Annual Exam (Refills)    Patient has diagnosis of HTN, HLD, DM2, Tobacco use. Patient seen in clinic today for medication refills and stating that she has frequent rashes under her extra abdominal skin/folds. Patient states rash increases frequency during summer. Patient is diagnosed with diabetes mellitus. Patient states she does take her medication as prescribed. Patient does check her blood pressure and blood glucose at home, denies abnormal readings. Patient last seen in clinic on 10/26/2021 by Dr. Chacon.     Patient followed by Orthopedic Clinic for right knee osteoarthritis. Last appointment on 03/08/2022. Patient received Euflexxa injection  On 03/08/2022, 03/18/2022, and 03/29/2022. Patient states mild relief. Patient to follow up as needed.     Mammogram: 03/10/2021, negative  Pap: Hysterectomy  FIT: 11/02/2021, negative  Diabetic Eye Exam: 06/27/2021  Diabetic Foot Exam: 10/26/2020  Lung Cancer Screening: deferred due to age  Bone Density: deferred due to age    Review of Systems   Constitutional: Negative.    HENT: Negative.    Eyes: Negative.    Respiratory: Negative.    Cardiovascular: Negative.    Gastrointestinal: Negative.    Endocrine: Negative.    Genitourinary: Negative.    Musculoskeletal: Negative.    Integumentary:  Negative.   Allergic/Immunologic: Negative.    Neurological: Negative.    Hematological: Negative.    Psychiatric/Behavioral: Negative.          Objective:      Physical Exam  Vitals reviewed.   Constitutional:       Appearance: Normal appearance.   HENT:      Head: Normocephalic and atraumatic.      Mouth/Throat:      Mouth: Mucous membranes are moist.      Pharynx: Oropharynx is clear.   Eyes:      Extraocular Movements: Extraocular movements intact.      Conjunctiva/sclera: Conjunctivae normal.      Pupils: Pupils are equal, round, and reactive to light.   Cardiovascular:      Rate and  Rhythm: Normal rate and regular rhythm.      Heart sounds: Normal heart sounds.   Pulmonary:      Effort: Pulmonary effort is normal.      Breath sounds: Normal breath sounds.   Abdominal:      General: Bowel sounds are normal.   Musculoskeletal:         General: Normal range of motion.      Cervical back: Normal range of motion.   Skin:     General: Skin is warm and dry.      Comments: Excessive abdominal skin/fold   Neurological:      Mental Status: She is alert and oriented to person, place, and time.   Psychiatric:         Mood and Affect: Mood normal.         Behavior: Behavior normal.         Assessment:       Problem List Items Addressed This Visit        Cardiac/Vascular    Hyperlipidemia (Chronic)     Continue Simvastatin 20 mg daily  Weight loss encouraged  Low fat/high fiber diet  Increase physical activity  Tobacco cessation encouraged  Chol: 130  HDL: 48  Tri  LDL: 73           Relevant Orders    Lipid Panel    Hypertension - Primary (Chronic)     B/P: 100/65  UA Creatinine: ordered  UA Microalbumin: ordered  EKG:   Continue Atenolol 25 mg daily, HCTZ 25 mg daily, Losartan 50 mg daily  DASH diet  Continue home blood pressure monitoring           Relevant Orders    CBC Auto Differential    Comprehensive Metabolic Panel    Microalbumin/Creatinine Ratio, Urine    TSH    Urinalysis, Reflex to Urine Culture Urine, Clean Catch       Endocrine    Diabetes mellitus (Chronic)     Continue Metformin 500 mg bid  Continue home CBG monitoring.  Hypoglycemic episodes: denies  BMI: 23.09  HgbA1c: 5.9  UA Creatinine: ordered  UA Microalbumin: ordered  On Simvastatin  Weight Loss Encouraged  ADA Diet    Patient would benefit from excess skin removal from abdomen due to frequent rashes to abdominal skin folds with diagnosis of diabetes mellitus.           Relevant Medications    metFORMIN (GLUCOPHAGE) 500 MG tablet    Other Relevant Orders    CBC Auto Differential    Comprehensive Metabolic Panel     Microalbumin/Creatinine Ratio, Urine    Urinalysis, Reflex to Urine Culture Urine, Clean Catch    Hemoglobin A1C       Other    Tobacco user (Chronic)     Smoking cessation education provided, encouraged. Patient refused smoking cessation at this time.            Relevant Orders    Ambulatory referral/consult to Smoking Cessation Program          Plan:    Follow up in 6 months with labs to be done prior to visit.

## 2022-10-16 ENCOUNTER — HOSPITAL ENCOUNTER (EMERGENCY)
Facility: HOSPITAL | Age: 57
Discharge: HOME | End: 2022-10-16
Attending: FAMILY MEDICINE
Payer: OTHER GOVERNMENT

## 2022-10-16 VITALS
OXYGEN SATURATION: 96 % | DIASTOLIC BLOOD PRESSURE: 77 MMHG | BODY MASS INDEX: 25.46 KG/M2 | HEIGHT: 64 IN | RESPIRATION RATE: 16 BRPM | SYSTOLIC BLOOD PRESSURE: 128 MMHG | HEART RATE: 76 BPM | TEMPERATURE: 98 F | WEIGHT: 149.13 LBS

## 2022-10-16 DIAGNOSIS — Z87.898 HISTORY OF ORTHOPNEA: ICD-10-CM

## 2022-10-16 DIAGNOSIS — R07.9 CHEST PAIN: ICD-10-CM

## 2022-10-16 DIAGNOSIS — J40 BRONCHITIS: ICD-10-CM

## 2022-10-16 DIAGNOSIS — I10 HYPERTENSION, UNCONTROLLED: ICD-10-CM

## 2022-10-16 DIAGNOSIS — E83.42 HYPOMAGNESEMIA: ICD-10-CM

## 2022-10-16 DIAGNOSIS — Z71.6 ENCOUNTER FOR SMOKING CESSATION COUNSELING: ICD-10-CM

## 2022-10-16 DIAGNOSIS — R05.9 COUGH, UNSPECIFIED TYPE: Primary | ICD-10-CM

## 2022-10-16 LAB
ABS NEUT CALC (OHS): 2.49 X10(3)/MCL (ref 2.1–9.2)
ALBUMIN SERPL-MCNC: 4.3 GM/DL (ref 3.5–5)
ALBUMIN/GLOB SERPL: 1.9 RATIO (ref 1.1–2)
ALP SERPL-CCNC: 58 UNIT/L (ref 40–150)
ALT SERPL-CCNC: 31 UNIT/L (ref 0–55)
AST SERPL-CCNC: 32 UNIT/L (ref 5–34)
BILIRUBIN DIRECT+TOT PNL SERPL-MCNC: 0.5 MG/DL
BNP BLD-MCNC: 54.7 PG/ML
BUN SERPL-MCNC: 24 MG/DL (ref 9.8–20.1)
CALCIUM SERPL-MCNC: 10.4 MG/DL (ref 8.4–10.2)
CHLORIDE SERPL-SCNC: 108 MMOL/L (ref 98–107)
CO2 SERPL-SCNC: 25 MMOL/L (ref 22–29)
CREAT SERPL-MCNC: 0.72 MG/DL (ref 0.55–1.02)
EOSINOPHIL NFR BLD MANUAL: 0.17 X10(3)/MCL (ref 0–0.9)
EOSINOPHIL NFR BLD MANUAL: 4 % (ref 0–8)
ERYTHROCYTE [DISTWIDTH] IN BLOOD BY AUTOMATED COUNT: 12 % (ref 11.5–17)
FLUAV AG UPPER RESP QL IA.RAPID: NOT DETECTED
FLUBV AG UPPER RESP QL IA.RAPID: NOT DETECTED
GFR SERPLBLD CREATININE-BSD FMLA CKD-EPI: >60 MLS/MIN/1.73/M2
GLOBULIN SER-MCNC: 2.3 GM/DL (ref 2.4–3.5)
GLUCOSE SERPL-MCNC: 130 MG/DL (ref 74–100)
HCT VFR BLD AUTO: 42.3 % (ref 37–47)
HGB BLD-MCNC: 13.2 GM/DL (ref 12–16)
IMM GRANULOCYTES # BLD AUTO: 0.01 X10(3)/MCL (ref 0–0.04)
IMM GRANULOCYTES NFR BLD AUTO: 0.2 %
LYMPHOCYTES NFR BLD MANUAL: 1.33 X10(3)/MCL
LYMPHOCYTES NFR BLD MANUAL: 31 % (ref 13–40)
MAGNESIUM SERPL-MCNC: 1 MG/DL (ref 1.6–2.6)
MCH RBC QN AUTO: 29.9 PG (ref 27–31)
MCHC RBC AUTO-ENTMCNC: 31.2 MG/DL (ref 33–36)
MCV RBC AUTO: 95.9 FL (ref 80–94)
MONOCYTES NFR BLD MANUAL: 0.3 X10(3)/MCL (ref 0.1–1.3)
MONOCYTES NFR BLD MANUAL: 7 % (ref 2–11)
NEUTROPHILS NFR BLD MANUAL: 58 % (ref 47–80)
NRBC BLD AUTO-RTO: 0 %
PLATELET # BLD AUTO: 159 X10(3)/MCL (ref 130–400)
PLATELET # BLD EST: ADEQUATE 10*3/UL
PMV BLD AUTO: 12.2 FL (ref 7.4–10.4)
POTASSIUM SERPL-SCNC: 4.4 MMOL/L (ref 3.5–5.1)
PROT SERPL-MCNC: 6.6 GM/DL (ref 6.4–8.3)
RBC # BLD AUTO: 4.41 X10(6)/MCL (ref 4.2–5.4)
RBC MORPH BLD: NORMAL
SARS-COV-2 RNA RESP QL NAA+PROBE: NOT DETECTED
SODIUM SERPL-SCNC: 144 MMOL/L (ref 136–145)
TROPONIN I SERPL-MCNC: <0.01 NG/ML (ref 0–0.04)
WBC # SPEC AUTO: 4.3 X10(3)/MCL (ref 4.5–11.5)

## 2022-10-16 PROCEDURE — 36415 COLL VENOUS BLD VENIPUNCTURE: CPT | Performed by: FAMILY MEDICINE

## 2022-10-16 PROCEDURE — 94640 AIRWAY INHALATION TREATMENT: CPT

## 2022-10-16 PROCEDURE — 63600175 PHARM REV CODE 636 W HCPCS: Performed by: INTERNAL MEDICINE

## 2022-10-16 PROCEDURE — 80053 COMPREHEN METABOLIC PANEL: CPT | Performed by: FAMILY MEDICINE

## 2022-10-16 PROCEDURE — 84484 ASSAY OF TROPONIN QUANT: CPT | Performed by: FAMILY MEDICINE

## 2022-10-16 PROCEDURE — 63600175 PHARM REV CODE 636 W HCPCS: Performed by: FAMILY MEDICINE

## 2022-10-16 PROCEDURE — 83880 ASSAY OF NATRIURETIC PEPTIDE: CPT | Performed by: INTERNAL MEDICINE

## 2022-10-16 PROCEDURE — 96375 TX/PRO/DX INJ NEW DRUG ADDON: CPT

## 2022-10-16 PROCEDURE — 36415 COLL VENOUS BLD VENIPUNCTURE: CPT | Performed by: INTERNAL MEDICINE

## 2022-10-16 PROCEDURE — 96365 THER/PROPH/DIAG IV INF INIT: CPT

## 2022-10-16 PROCEDURE — 85027 COMPLETE CBC AUTOMATED: CPT | Performed by: FAMILY MEDICINE

## 2022-10-16 PROCEDURE — 83735 ASSAY OF MAGNESIUM: CPT | Performed by: FAMILY MEDICINE

## 2022-10-16 PROCEDURE — 25000003 PHARM REV CODE 250: Performed by: FAMILY MEDICINE

## 2022-10-16 PROCEDURE — 96366 THER/PROPH/DIAG IV INF ADDON: CPT

## 2022-10-16 PROCEDURE — 0241U COVID/FLU A&B PCR: CPT | Performed by: FAMILY MEDICINE

## 2022-10-16 PROCEDURE — 25000242 PHARM REV CODE 250 ALT 637 W/ HCPCS: Performed by: FAMILY MEDICINE

## 2022-10-16 PROCEDURE — 99285 EMERGENCY DEPT VISIT HI MDM: CPT | Mod: 25

## 2022-10-16 PROCEDURE — 93005 ELECTROCARDIOGRAM TRACING: CPT

## 2022-10-16 RX ORDER — ALBUTEROL SULFATE 90 UG/1
1-2 AEROSOL, METERED RESPIRATORY (INHALATION) EVERY 6 HOURS PRN
Qty: 18 G | Refills: 2 | Status: SHIPPED | OUTPATIENT
Start: 2022-10-16 | End: 2023-07-25

## 2022-10-16 RX ORDER — FUROSEMIDE 10 MG/ML
20 INJECTION INTRAMUSCULAR; INTRAVENOUS
Status: COMPLETED | OUTPATIENT
Start: 2022-10-16 | End: 2022-10-16

## 2022-10-16 RX ORDER — GUAIFENESIN 100 MG/5ML
200 SOLUTION ORAL EVERY 4 HOURS PRN
Qty: 236 ML | Refills: 0 | Status: SHIPPED | OUTPATIENT
Start: 2022-10-16 | End: 2022-10-26

## 2022-10-16 RX ORDER — IPRATROPIUM BROMIDE AND ALBUTEROL SULFATE 2.5; .5 MG/3ML; MG/3ML
9 SOLUTION RESPIRATORY (INHALATION)
Status: COMPLETED | OUTPATIENT
Start: 2022-10-16 | End: 2022-10-16

## 2022-10-16 RX ORDER — GUAIFENESIN/DEXTROMETHORPHAN 100-10MG/5
10 SYRUP ORAL
Status: COMPLETED | OUTPATIENT
Start: 2022-10-16 | End: 2022-10-16

## 2022-10-16 RX ORDER — MAGNESIUM SULFATE HEPTAHYDRATE 40 MG/ML
2 INJECTION, SOLUTION INTRAVENOUS
Status: COMPLETED | OUTPATIENT
Start: 2022-10-16 | End: 2022-10-16

## 2022-10-16 RX ORDER — PREDNISONE 20 MG/1
40 TABLET ORAL DAILY
Qty: 10 TABLET | Refills: 0 | Status: SHIPPED | OUTPATIENT
Start: 2022-10-16 | End: 2022-10-21

## 2022-10-16 RX ADMIN — IPRATROPIUM BROMIDE AND ALBUTEROL SULFATE 9 ML: 2.5; .5 SOLUTION RESPIRATORY (INHALATION) at 06:10

## 2022-10-16 RX ADMIN — PREDNISONE 60 MG: 50 TABLET ORAL at 06:10

## 2022-10-16 RX ADMIN — MAGNESIUM SULFATE 2 G: 2 INJECTION INTRAVENOUS at 08:10

## 2022-10-16 RX ADMIN — FUROSEMIDE 20 MG: 10 INJECTION INTRAMUSCULAR; INTRAVENOUS at 08:10

## 2022-10-16 RX ADMIN — GUAIFENESIN AND DEXTROMETHORPHAN 10 ML: 100; 10 SYRUP ORAL at 06:10

## 2022-10-16 NOTE — ED PROVIDER NOTES
Name: Maria Luisa Boyle   Age: 56 y.o.  Sex: female    Chief complaint:   Chief Complaint   Patient presents with    Cough     Cough, congestion, fever onset a few days ago. Pt reports chest pain secondary to cough.       Patient arrived with: Private  History obtained from: Patient    Subjective:   56-year-old female with a past medical history of diabetes and hypertension that presents to emergency department for chest pain and shortness of breath.  Patient said last week she had URI like symptoms such as fever, chills, sweats all of which are now resolved.  She also had a cough which is continued.  She now starting to have chest pain or shortness of breath which is associated only with her cough.  She has no chest pain or shortness of breath at rest.  Patient said her chest pain is getting bad merged she has difficulty doing things.  Denies abdominal pain, nausea, vomiting, diarrhea, dysuria, hematuria.    Past Medical History:   Diagnosis Date    Diabetes mellitus     Hypertension      Past Surgical History:   Procedure Laterality Date    HYSTERECTOMY       Social History     Socioeconomic History    Marital status:    Tobacco Use    Smoking status: Every Day     Packs/day: 0.50     Years: 25.00     Pack years: 12.50     Types: Cigarettes    Smokeless tobacco: Never   Substance and Sexual Activity    Alcohol use: Not Currently    Drug use: Never    Sexual activity: Yes     Review of patient's allergies indicates:  No Known Allergies     Review of Systems   Constitutional:  Negative for diaphoresis and fever.   HENT:  Negative for congestion and sore throat.    Eyes:  Negative for pain and discharge.   Respiratory:  Positive for cough and shortness of breath.    Cardiovascular:  Positive for chest pain. Negative for palpitations.   Gastrointestinal:  Negative for diarrhea and vomiting.   Genitourinary:  Negative for dysuria and hematuria.   Musculoskeletal:  Negative for back pain and myalgias.   Skin:   Negative for itching and rash.   Neurological:  Negative for weakness and headaches.        Objective:     Vitals:    10/16/22 0607   BP: (!) 180/84   Pulse: 79   Resp: 18   Temp: 98.3 °F (36.8 °C)        Physical Exam  Constitutional:       Appearance: She is not toxic-appearing.   HENT:      Head: Normocephalic and atraumatic.   Cardiovascular:      Rate and Rhythm: Regular rhythm.      Pulses: Normal pulses.   Pulmonary:      Effort: Pulmonary effort is normal.      Breath sounds: Wheezing and rhonchi present.   Abdominal:      General: Abdomen is flat. Bowel sounds are normal.      Palpations: Abdomen is soft.   Musculoskeletal:         General: No deformity. Normal range of motion.      Cervical back: Normal range of motion and neck supple.      Right lower leg: No edema.      Left lower leg: No edema.   Skin:     General: Skin is warm and dry.   Neurological:      General: No focal deficit present.      Mental Status: She is alert and oriented to person, place, and time.   Psychiatric:         Mood and Affect: Mood normal.         Behavior: Behavior normal.        Records:  Nursing records and triage records reviewed  Prior records reviewed    Medical decision making:   Presents to the emergency department for chest pain and shortness of breath associated with cough.  Patient is nontoxic appearing.  Afebrile, non tachycardic, hypertensive 180/84, saturating well on room air.  Patient had coarse breath sounds bilaterally with wheezing.  No lower extremity edema.  Will get cardiac workup for further evaluation.  Patient will get DuoNebs, steroids, cough suppressant for symptomatic management.      I did smoking cessation counseling with the patient since it could be directly affecting their current symptoms related to chest pain and shortness of breath for 5 minutes.  We discussed options such as:   - Nicotine gums   - Nicotine lozenges  - Nicotine patches   - Other prescription options that can be managed in  discussed with your primary care doctor  -joint SmokeFreeTXT by texting [facility] at [phone] for encouragement, advice, and tips to quit smoking      ED Course as of 10/16/22 0647   Sun Oct 16, 2022   0629 EKG is nonischemic [RK]   0647 Patient will be signed out to Dr. Regalado.      Pending-labs, chest x-ray, re-evaluation [RK]      ED Course User Index  [RK] José Miguel Nicole MD          EKG:  ECG Results              EKG 12-lead (Preliminary result)  Result time 10/16/22 06:29:30      ED Interpretation by José Miguel Nicole MD (10/16/22 06:29:30, [facility], Emergency Medicine)    Normal sinus rhythm at a rate of 62, no signs of ST elevation or depression, normal axis, normal intervals with a QTC of 414                                     Procedures       Diagnosis:  Final diagnoses:  [R07.9] Chest pain  [R05.9] Cough, unspecified type (Primary)  [Z71.6] Encounter for smoking cessation counseling          José Miguel Nicole M.D.  Emergency Medicine Physician     (Please note that this chart was completed via voice to text dictation. There may be typographical errors or substitutions that are unintentional, or uncorrected. Every attempt was made to proofread the chart prior to completion. If there are any questions, please contact the physician for final clarification).         José Miguel Nicole MD  10/16/22 0647

## 2022-10-16 NOTE — DISCHARGE INSTRUCTIONS
I recommend you stop smoking because it is most likely contributing to your symptoms and making them worse.   You can do the following things to help stop smoking:  - Nicotine gums   - Nicotine lozenges  - Nicotine patches   - Other prescription options that can be managed in discussed with your primary care doctor  -joint SmokeFreeTXT by texting [employer] at [phone] for encouragement, advice, and tips to quit smoking

## 2022-10-16 NOTE — ED PROVIDER NOTES
Encounter Date: 10/16/2022       History     Chief Complaint   Patient presents with    Cough     Cough, congestion, fever onset a few days ago. Pt reports chest pain secondary to cough.      HPI  Review of patient's allergies indicates:  No Known Allergies  Past Medical History:   Diagnosis Date    Diabetes mellitus     Hypertension      Past Surgical History:   Procedure Laterality Date    HYSTERECTOMY       Family History   Problem Relation Age of Onset    Hypertension Mother     Hyperlipidemia Mother     Diabetes Mother     Cancer Father     Hypertension Father     Hyperlipidemia Father      Social History     Tobacco Use    Smoking status: Every Day     Packs/day: 0.50     Years: 25.00     Pack years: 12.50     Types: Cigarettes    Smokeless tobacco: Never   Substance Use Topics    Alcohol use: Not Currently    Drug use: Never     Review of Systems    Physical Exam     Initial Vitals [10/16/22 0607]   BP Pulse Resp Temp SpO2   (!) 180/84 79 18 98.3 °F (36.8 °C) 99 %      MAP       --         Physical Exam    ED Course   Procedures  Labs Reviewed   COMPREHENSIVE METABOLIC PANEL - Abnormal; Notable for the following components:       Result Value    Chloride 108 (*)     Glucose Level 130 (*)     Blood Urea Nitrogen 24.0 (*)     Calcium Level Total 10.4 (*)     Globulin 2.3 (*)     All other components within normal limits   MAGNESIUM - Abnormal; Notable for the following components:    Magnesium Level 1.00 (*)     All other components within normal limits   CBC WITH DIFFERENTIAL - Abnormal; Notable for the following components:    WBC 4.3 (*)     MCV 95.9 (*)     MCHC 31.2 (*)     MPV 12.2 (*)     All other components within normal limits   COVID/FLU A&B PCR - Normal    Narrative:     The Xpert Xpress SARS-CoV-2/FLU/RSV plus is a rapid, multiplexed real-time PCR test intended for the simultaneous qualitative detection and differentiation of SARS-CoV-2, Influenza A, Influenza B, and respiratory syncytial virus  (RSV) viral RNA in either nasopharyngeal swab or nasal swab specimens.         TROPONIN I - Normal   MANUAL DIFFERENTIAL - Normal   B-TYPE NATRIURETIC PEPTIDE - Normal   CBC W/ AUTO DIFFERENTIAL    Narrative:     The following orders were created for panel order CBC Auto Differential.  Procedure                               Abnormality         Status                     ---------                               -----------         ------                     CBC with Differential[[id number]]        Abnormal            Final result               Manual Differential[[id number]]          Normal              Final result                 Please view results for these tests on the individual orders.   EXTRA TUBES    Narrative:     The following orders were created for panel order EXTRA TUBES.  Procedure                               Abnormality         Status                     ---------                               -----------         ------                     Light Blue Top Hold[[id number]]                              In process                 Lavender Top Hold[[id number]]                                In process                   Please view results for these tests on the individual orders.   LIGHT BLUE TOP HOLD   LAVENDER TOP HOLD   EXTRA TUBES    Narrative:     The following orders were created for panel order EXTRA TUBES.  Procedure                               Abnormality         Status                     ---------                               -----------         ------                     Gold Top Hold[[id number]]                                    In process                   Please view results for these tests on the individual orders.   GOLD TOP HOLD        ECG Results              EKG 12-lead (Preliminary result)  Result time 10/16/22 06:29:30      ED Interpretation by José Miguel Nicole MD (10/16/22 06:29:30, [facility], Emergency Medicine)    Normal sinus rhythm at a rate of 62,  no signs of ST elevation or depression, normal axis, normal intervals with a QTC of 414                                  Imaging Results              X-Ray Chest PA And Lateral (Final result)  Result time 10/16/22 09:09:02      Final result by Kolby Masters MD (10/16/22 09:09:02)                   Impression:      No acute chest disease is identified.      Electronically signed by: Kolby Masters  Date:    10/16/2022  Time:    09:09               Narrative:    EXAMINATION:  XR CHEST PA AND LATERAL    CLINICAL HISTORY:  Chest pain;, .    FINDINGS:  No alveolar consolidation, effusion, or pneumothorax is seen.   The thoracic aorta is normal  cardiac silhouette, central pulmonary vessels and mediastinum are normal in size and are grossly unremarkable.   visualized osseous structures are grossly unremarkable.                                       Medications   magnesium sulfate 2g in water 50mL IVPB (premix) (2 g Intravenous New Bag 10/16/22 0857)   dextromethorphan-guaiFENesin  mg/5 ml liquid 10 mL (10 mLs Oral Given 10/16/22 0637)   albuterol-ipratropium 2.5 mg-0.5 mg/3 mL nebulizer solution 9 mL (9 mLs Nebulization Given by Other 10/16/22 0653)   predniSONE tablet 60 mg (60 mg Oral Given 10/16/22 0637)   furosemide injection 20 mg (20 mg Intravenous Given 10/16/22 0857)                 ED Course as of 10/16/22 1013   Sun Oct 16, 2022   0629 EKG is nonischemic [RK]   0647 Patient will be signed out to Dr. Regalado.      Pending-labs, chest x-ray, re-evaluation [RK]      ED Course User Index  [RK] José Miguel Nicole MD                 Clinical Impression:   Final diagnoses:  [R07.9] Chest pain  [R05.9] Cough, unspecified type (Primary)  [Z71.6] Encounter for smoking cessation counseling  [E83.42] Hypomagnesemia  [I10] Hypertension, uncontrolled  [J40] Bronchitis  [Z87.898] History of orthopnea        ED Disposition Condition    Discharge Stable          ED Prescriptions       Medication Sig Dispense  Start Date End Date Auth. Provider    albuterol (PROVENTIL/VENTOLIN HFA) 90 mcg/actuation inhaler Inhale 1-2 puffs into the lungs every 6 (six) hours as needed for Wheezing or Shortness of Breath. Rescue 18 g 10/16/2022 10/16/2023 Jasen Mckenna MD    predniSONE (DELTASONE) 20 MG tablet Take 2 tablets (40 mg total) by mouth once daily. for 5 days 10 tablet 10/16/2022 10/21/2022 Jasen Mckenna MD    guaiFENesin 100 mg/5 ml (ROBITUSSIN) 100 mg/5 mL syrup Take 10 mLs (200 mg total) by mouth every 4 (four) hours as needed for Cough. 236 mL 10/16/2022 10/26/2022 Jasen Mckenna MD          Follow-up Information       Follow up With Specialties Details Why Contact Info    PCP  Schedule an appointment as soon as possible for a visit in 1 week Follow up with your primary care doctor for re-evaluation Follow up with your primary care doctor for re-evaluation    Cyndi Chilel, PADMINI Family [facility] In 1 week  [address] [facility] [address]  [phone]      [facility] Emergency Medicine  If symptoms worsen [address] [facility] [id number] [phone]             Jasen Mckenna MD  10/16/22 1013

## 2022-10-16 NOTE — PROVIDER PROGRESS NOTES - EMERGENCY DEPT.
Encounter Date: 10/16/2022    ED Physician Progress Notes        8:19 AM    CXR unremarkable, labs with leukopenia and unremarkable differential, hypomagnesemia noticed. Auscultation reveal crackles, pt in active nebs. Pt with U-HTN, will order lasix and mag. Will F/U for final dispo    10:09 AM    At this time pt stable, CTA x 2, feeling better. Will D/H

## 2022-11-16 ENCOUNTER — LAB VISIT (OUTPATIENT)
Dept: LAB | Facility: HOSPITAL | Age: 57
End: 2022-11-16
Attending: NURSE PRACTITIONER
Payer: OTHER GOVERNMENT

## 2022-11-16 DIAGNOSIS — E11.9 TYPE 2 DIABETES MELLITUS WITHOUT COMPLICATION, WITHOUT LONG-TERM CURRENT USE OF INSULIN: ICD-10-CM

## 2022-11-16 DIAGNOSIS — I10 PRIMARY HYPERTENSION: ICD-10-CM

## 2022-11-16 DIAGNOSIS — E78.49 OTHER HYPERLIPIDEMIA: Chronic | ICD-10-CM

## 2022-11-16 LAB
ALBUMIN SERPL-MCNC: 4.1 GM/DL (ref 3.5–5)
ALBUMIN/GLOB SERPL: 2 RATIO (ref 1.1–2)
ALP SERPL-CCNC: 61 UNIT/L (ref 40–150)
ALT SERPL-CCNC: 19 UNIT/L (ref 0–55)
AST SERPL-CCNC: 20 UNIT/L (ref 5–34)
BASOPHILS # BLD AUTO: 0.02 X10(3)/MCL (ref 0–0.2)
BASOPHILS NFR BLD AUTO: 0.4 %
BILIRUBIN DIRECT+TOT PNL SERPL-MCNC: 0.9 MG/DL
BUN SERPL-MCNC: 12.7 MG/DL (ref 9.8–20.1)
CALCIUM SERPL-MCNC: 10.1 MG/DL (ref 8.4–10.2)
CHLORIDE SERPL-SCNC: 106 MMOL/L (ref 98–107)
CHOLEST SERPL-MCNC: 160 MG/DL
CHOLEST/HDLC SERPL: 2 {RATIO} (ref 0–5)
CO2 SERPL-SCNC: 26 MMOL/L (ref 22–29)
CREAT SERPL-MCNC: 0.71 MG/DL (ref 0.55–1.02)
EOSINOPHIL # BLD AUTO: 0.04 X10(3)/MCL (ref 0–0.9)
EOSINOPHIL NFR BLD AUTO: 0.8 %
ERYTHROCYTE [DISTWIDTH] IN BLOOD BY AUTOMATED COUNT: 12.8 % (ref 11.5–17)
EST. AVERAGE GLUCOSE BLD GHB EST-MCNC: 139.9 MG/DL
GFR SERPLBLD CREATININE-BSD FMLA CKD-EPI: >60 MLS/MIN/1.73/M2
GLOBULIN SER-MCNC: 2.1 GM/DL (ref 2.4–3.5)
GLUCOSE SERPL-MCNC: 152 MG/DL (ref 74–100)
HBA1C MFR BLD: 6.5 %
HCT VFR BLD AUTO: 39.9 % (ref 37–47)
HDLC SERPL-MCNC: 65 MG/DL (ref 35–60)
HGB BLD-MCNC: 12.6 GM/DL (ref 12–16)
IMM GRANULOCYTES # BLD AUTO: 0.01 X10(3)/MCL (ref 0–0.04)
IMM GRANULOCYTES NFR BLD AUTO: 0.2 %
LDLC SERPL CALC-MCNC: 80 MG/DL (ref 50–140)
LYMPHOCYTES # BLD AUTO: 2.28 X10(3)/MCL (ref 0.6–4.6)
LYMPHOCYTES NFR BLD AUTO: 45.1 %
MCH RBC QN AUTO: 30 PG (ref 27–31)
MCHC RBC AUTO-ENTMCNC: 31.6 MG/DL (ref 33–36)
MCV RBC AUTO: 95 FL (ref 80–94)
MONOCYTES # BLD AUTO: 0.56 X10(3)/MCL (ref 0.1–1.3)
MONOCYTES NFR BLD AUTO: 11.1 %
NEUTROPHILS # BLD AUTO: 2.2 X10(3)/MCL (ref 2.1–9.2)
NEUTROPHILS NFR BLD AUTO: 42.4 %
NRBC BLD AUTO-RTO: 0 %
PLATELET # BLD AUTO: 182 X10(3)/MCL (ref 130–400)
PMV BLD AUTO: 10.7 FL (ref 7.4–10.4)
POTASSIUM SERPL-SCNC: 3.8 MMOL/L (ref 3.5–5.1)
PROT SERPL-MCNC: 6.2 GM/DL (ref 6.4–8.3)
RBC # BLD AUTO: 4.2 X10(6)/MCL (ref 4.2–5.4)
SODIUM SERPL-SCNC: 142 MMOL/L (ref 136–145)
TRIGL SERPL-MCNC: 76 MG/DL (ref 37–140)
TSH SERPL-ACNC: 1.72 UIU/ML (ref 0.35–4.94)
VLDLC SERPL CALC-MCNC: 15 MG/DL
WBC # SPEC AUTO: 5.1 X10(3)/MCL (ref 4.5–11.5)

## 2022-11-16 PROCEDURE — 80061 LIPID PANEL: CPT

## 2022-11-16 PROCEDURE — 80053 COMPREHEN METABOLIC PANEL: CPT

## 2022-11-16 PROCEDURE — 84443 ASSAY THYROID STIM HORMONE: CPT

## 2022-11-16 PROCEDURE — 36415 COLL VENOUS BLD VENIPUNCTURE: CPT

## 2022-11-16 PROCEDURE — 83036 HEMOGLOBIN GLYCOSYLATED A1C: CPT

## 2022-11-16 PROCEDURE — 85025 COMPLETE CBC W/AUTO DIFF WBC: CPT

## 2022-11-21 ENCOUNTER — HOSPITAL ENCOUNTER (EMERGENCY)
Facility: HOSPITAL | Age: 57
Discharge: HOME | End: 2022-11-21
Attending: STUDENT IN AN ORGANIZED HEALTH CARE EDUCATION/TRAINING PROGRAM
Payer: OTHER GOVERNMENT

## 2022-11-21 VITALS
SYSTOLIC BLOOD PRESSURE: 109 MMHG | RESPIRATION RATE: 20 BRPM | OXYGEN SATURATION: 99 % | TEMPERATURE: 98 F | HEIGHT: 67 IN | BODY MASS INDEX: 23.24 KG/M2 | WEIGHT: 148.06 LBS | DIASTOLIC BLOOD PRESSURE: 68 MMHG | HEART RATE: 62 BPM

## 2022-11-21 DIAGNOSIS — S29.011A CHEST WALL MUSCLE STRAIN, INITIAL ENCOUNTER: Primary | ICD-10-CM

## 2022-11-21 LAB
ALBUMIN SERPL-MCNC: 4.2 GM/DL (ref 3.5–5)
ALBUMIN/GLOB SERPL: 1.6 RATIO (ref 1.1–2)
ALP SERPL-CCNC: 50 UNIT/L (ref 40–150)
ALT SERPL-CCNC: 20 UNIT/L (ref 0–55)
AST SERPL-CCNC: 20 UNIT/L (ref 5–34)
BASOPHILS # BLD AUTO: 0.02 X10(3)/MCL (ref 0–0.2)
BASOPHILS NFR BLD AUTO: 0.3 %
BILIRUBIN DIRECT+TOT PNL SERPL-MCNC: 0.3 MG/DL
BNP BLD-MCNC: 25.9 PG/ML
BUN SERPL-MCNC: 19.5 MG/DL (ref 9.8–20.1)
CALCIUM SERPL-MCNC: 10.4 MG/DL (ref 8.4–10.2)
CHLORIDE SERPL-SCNC: 107 MMOL/L (ref 98–107)
CO2 SERPL-SCNC: 26 MMOL/L (ref 22–29)
CREAT SERPL-MCNC: 0.9 MG/DL (ref 0.55–1.02)
D DIMER PPP IA.FEU-MCNC: <0.27 UG/ML FEU (ref 0–0.5)
EOSINOPHIL # BLD AUTO: 0.06 X10(3)/MCL (ref 0–0.9)
EOSINOPHIL NFR BLD AUTO: 0.9 %
ERYTHROCYTE [DISTWIDTH] IN BLOOD BY AUTOMATED COUNT: 13 % (ref 11.5–17)
FLUAV AG UPPER RESP QL IA.RAPID: NOT DETECTED
FLUBV AG UPPER RESP QL IA.RAPID: NOT DETECTED
GFR SERPLBLD CREATININE-BSD FMLA CKD-EPI: >60 MLS/MIN/1.73/M2
GLOBULIN SER-MCNC: 2.7 GM/DL (ref 2.4–3.5)
GLUCOSE SERPL-MCNC: 120 MG/DL (ref 74–100)
HCT VFR BLD AUTO: 38.6 % (ref 37–47)
HGB BLD-MCNC: 12.6 GM/DL (ref 12–16)
IMM GRANULOCYTES # BLD AUTO: 0.02 X10(3)/MCL (ref 0–0.04)
IMM GRANULOCYTES NFR BLD AUTO: 0.3 %
LYMPHOCYTES # BLD AUTO: 2.25 X10(3)/MCL (ref 0.6–4.6)
LYMPHOCYTES NFR BLD AUTO: 33.4 %
MCH RBC QN AUTO: 30.1 PG (ref 27–31)
MCHC RBC AUTO-ENTMCNC: 32.6 MG/DL (ref 33–36)
MCV RBC AUTO: 92.1 FL (ref 80–94)
MONOCYTES # BLD AUTO: 0.65 X10(3)/MCL (ref 0.1–1.3)
MONOCYTES NFR BLD AUTO: 9.7 %
NEUTROPHILS # BLD AUTO: 3.7 X10(3)/MCL (ref 2.1–9.2)
NEUTROPHILS NFR BLD AUTO: 55.4 %
NRBC BLD AUTO-RTO: 0 %
PLATELET # BLD AUTO: 210 X10(3)/MCL (ref 130–400)
PMV BLD AUTO: 10.7 FL (ref 7.4–10.4)
POTASSIUM SERPL-SCNC: 4.1 MMOL/L (ref 3.5–5.1)
PROT SERPL-MCNC: 6.9 GM/DL (ref 6.4–8.3)
RBC # BLD AUTO: 4.19 X10(6)/MCL (ref 4.2–5.4)
RSV A 5' UTR RNA NPH QL NAA+PROBE: NOT DETECTED
SARS-COV-2 RNA RESP QL NAA+PROBE: NOT DETECTED
SODIUM SERPL-SCNC: 143 MMOL/L (ref 136–145)
TROPONIN I SERPL-MCNC: <0.01 NG/ML (ref 0–0.04)
WBC # SPEC AUTO: 6.7 X10(3)/MCL (ref 4.5–11.5)

## 2022-11-21 PROCEDURE — 80053 COMPREHEN METABOLIC PANEL: CPT | Performed by: STUDENT IN AN ORGANIZED HEALTH CARE EDUCATION/TRAINING PROGRAM

## 2022-11-21 PROCEDURE — 83880 ASSAY OF NATRIURETIC PEPTIDE: CPT | Performed by: STUDENT IN AN ORGANIZED HEALTH CARE EDUCATION/TRAINING PROGRAM

## 2022-11-21 PROCEDURE — 0241U COVID/RSV/FLU A&B PCR: CPT | Performed by: STUDENT IN AN ORGANIZED HEALTH CARE EDUCATION/TRAINING PROGRAM

## 2022-11-21 PROCEDURE — 99285 EMERGENCY DEPT VISIT HI MDM: CPT | Mod: 25

## 2022-11-21 PROCEDURE — 85025 COMPLETE CBC W/AUTO DIFF WBC: CPT | Performed by: STUDENT IN AN ORGANIZED HEALTH CARE EDUCATION/TRAINING PROGRAM

## 2022-11-21 PROCEDURE — 93005 ELECTROCARDIOGRAM TRACING: CPT

## 2022-11-21 PROCEDURE — 85379 FIBRIN DEGRADATION QUANT: CPT | Performed by: STUDENT IN AN ORGANIZED HEALTH CARE EDUCATION/TRAINING PROGRAM

## 2022-11-21 PROCEDURE — 25000003 PHARM REV CODE 250: Performed by: STUDENT IN AN ORGANIZED HEALTH CARE EDUCATION/TRAINING PROGRAM

## 2022-11-21 PROCEDURE — 84484 ASSAY OF TROPONIN QUANT: CPT | Performed by: STUDENT IN AN ORGANIZED HEALTH CARE EDUCATION/TRAINING PROGRAM

## 2022-11-21 RX ORDER — ACETAMINOPHEN 500 MG
1000 TABLET ORAL
Status: COMPLETED | OUTPATIENT
Start: 2022-11-21 | End: 2022-11-21

## 2022-11-21 RX ORDER — KETOROLAC TROMETHAMINE 10 MG/1
10 TABLET, FILM COATED ORAL EVERY 6 HOURS PRN
Qty: 20 TABLET | Refills: 0 | Status: SHIPPED | OUTPATIENT
Start: 2022-11-21 | End: 2022-11-26

## 2022-11-21 RX ORDER — ASPIRIN 325 MG
325 TABLET ORAL
Status: COMPLETED | OUTPATIENT
Start: 2022-11-21 | End: 2022-11-21

## 2022-11-21 RX ORDER — KETOROLAC TROMETHAMINE 10 MG/1
10 TABLET, FILM COATED ORAL
Status: COMPLETED | OUTPATIENT
Start: 2022-11-21 | End: 2022-11-21

## 2022-11-21 RX ORDER — BACLOFEN 10 MG/1
10 TABLET ORAL 3 TIMES DAILY
Qty: 30 TABLET | Refills: 0 | Status: SHIPPED | OUTPATIENT
Start: 2022-11-21 | End: 2023-07-25

## 2022-11-21 RX ADMIN — ACETAMINOPHEN 1000 MG: 500 TABLET ORAL at 07:11

## 2022-11-21 RX ADMIN — ASPIRIN 325 MG ORAL TABLET 325 MG: 325 PILL ORAL at 07:11

## 2022-11-21 RX ADMIN — KETOROLAC TROMETHAMINE 10 MG: 10 TABLET, FILM COATED ORAL at 07:11

## 2022-11-21 NOTE — ED PROVIDER NOTES
"Encounter Date: 11/21/2022       History     Chief Complaint   Patient presents with    Chest Pain     Right sided chest pain "pressure" x1 week. + "A little" SOB. Worse with bending over, coughing, and lifting.  No distress.      57-year-old female presents to ED for right chest pain.  Patient states 2 weeks ago she was moving furniture and states at that time a large piece she was relocating struck her chest wall. states discomfort did not start until the following week (last week). reports generalized reproducible right chest wall tenderness to palpation.  worse with movement of the right arm, direct palpation of the chest muscles and lying on it. denies any breast changes, no nipple discharge.  States she is due for a mammogram.  denies any swelling of the breast or chest wall, no skin changes.  No significant cough or shortness of breath. No radiation of her symptoms.  No direct trauma.  No fever or chills, no nausea or vomiting, no abdominal involvement.  No left-sided pains.  No other complaints or concerns at this time.    Review of patient's allergies indicates:  No Known Allergies  Past Medical History:   Diagnosis Date    Diabetes mellitus     Hypertension      Past Surgical History:   Procedure Laterality Date    HYSTERECTOMY       Family History   Problem Relation Age of Onset    Hypertension Mother     Hyperlipidemia Mother     Diabetes Mother     Cancer Father     Hypertension Father     Hyperlipidemia Father      Social History     Tobacco Use    Smoking status: Every Day     Packs/day: 0.50     Years: 25.00     Pack years: 12.50     Types: Cigarettes    Smokeless tobacco: Never   Substance Use Topics    Alcohol use: Not Currently    Drug use: Never     Review of Systems   Constitutional:  Negative for chills, diaphoresis and fever.   HENT:  Negative for congestion, rhinorrhea, sinus pain and sore throat.    Eyes:  Negative for pain, discharge and itching.   Respiratory:  Negative for cough, chest " tightness, shortness of breath, wheezing and stridor.    Cardiovascular:  Positive for chest pain. Negative for palpitations.   Gastrointestinal:  Negative for abdominal pain, nausea and vomiting.   Genitourinary:  Negative for dysuria, flank pain and hematuria.   Musculoskeletal:  Negative for back pain and myalgias.   Skin:  Negative for color change and rash.   Neurological:  Negative for dizziness, weakness and headaches.   Psychiatric/Behavioral:  Negative for confusion. The patient is not hyperactive.      Physical Exam     Initial Vitals [11/21/22 0723]   BP Pulse Resp Temp SpO2   122/85 84 20 98.2 °F (36.8 °C) 98 %      MAP       --         Physical Exam    Vitals reviewed.  Constitutional: She appears well-developed and well-nourished. She is not diaphoretic. No distress.   HENT:   Head: Normocephalic and atraumatic.   Eyes: Conjunctivae and EOM are normal. Pupils are equal, round, and reactive to light.   Neck: Neck supple. No tracheal deviation present.   Normal range of motion.  Cardiovascular:  Normal rate, regular rhythm, normal heart sounds and intact distal pulses.           Pulmonary/Chest: Breath sounds normal. No respiratory distress. She has no wheezes. She has no rhonchi. She has no rales. She exhibits tenderness.   Patient has significant tenderness to direct palpation of the musculature of the right chest wall.  Breast exam performed with nurse chaperone bedside and normal.  No acute findings.  Skin normal, breast normal.  Lungs clear bilaterally.   Abdominal: Abdomen is soft. There is no abdominal tenderness. There is no rebound and no guarding.   Musculoskeletal:         General: Normal range of motion.      Cervical back: Normal range of motion and neck supple.     Neurological: She is alert and oriented to person, place, and time. She has normal strength. GCS score is 15. GCS eye subscore is 4. GCS verbal subscore is 5. GCS motor subscore is 6.   Skin: Skin is warm and dry. Capillary  refill takes less than 2 seconds. No rash noted.   Psychiatric: She has a normal mood and affect. Her behavior is normal. Judgment and thought content normal.       ED Course   Procedures  Labs Reviewed   COMPREHENSIVE METABOLIC PANEL - Abnormal; Notable for the following components:       Result Value    Glucose Level 120 (*)     Calcium Level Total 10.4 (*)     All other components within normal limits   CBC WITH DIFFERENTIAL - Abnormal; Notable for the following components:    RBC 4.19 (*)     MCHC 32.6 (*)     MPV 10.7 (*)     All other components within normal limits   TROPONIN I - Normal   B-TYPE NATRIURETIC PEPTIDE - Normal   COVID/RSV/FLU A&B PCR - Normal    Narrative:     The Xpert Xpress SARS-CoV-2/FLU/RSV plus is a rapid, multiplexed real-time PCR test intended for the simultaneous qualitative detection and differentiation of SARS-CoV-2, Influenza A, Influenza B, and respiratory syncytial virus (RSV) viral RNA in either nasopharyngeal swab or nasal swab specimens.         D DIMER, QUANTITATIVE - Normal   CBC W/ AUTO DIFFERENTIAL    Narrative:     The following orders were created for panel order CBC auto differential.  Procedure                               Abnormality         Status                     ---------                               -----------         ------                     CBC with Differential[[id number]]        Abnormal            Final result                 Please view results for these tests on the individual orders.   EXTRA TUBES    Narrative:     The following orders were created for panel order EXTRA TUBES.  Procedure                               Abnormality         Status                     ---------                               -----------         ------                     Light Blue Top Hold[[id number]]                              In process                 Red Top Hold[[id number]]                                     In process                   Please view results for these  tests on the individual orders.   LIGHT BLUE TOP HOLD   RED TOP HOLD     EKG Readings: (Independently Interpreted)   Initial Reading: No STEMI. Rhythm: Normal Sinus Rhythm. Ectopy: No Ectopy. Conduction: Normal. Axis: Normal. Clinical Impression: Normal Sinus Rhythm   ECG Results              EKG 12-lead (Final result)  Result time 11/21/22 10:09:33      Final result by Interface, Lab In [facility] (11/21/22 10:09:33)                   Narrative:    Test Reason : R07.9,    Vent. Rate : 061 BPM     Atrial Rate : 061 BPM     P-R Int : 120 ms          QRS Dur : 080 ms      QT Int : 426 ms       P-R-T Axes : 067 075 056 degrees     QTc Int : 428 ms    Normal sinus rhythm with sinus arrhythmia  Normal ECG  When compared with ECG of 16-OCT-2022 06:24,  No significant change was found  Confirmed by Vignesh Ignacio MD ([id number]) on 11/21/2022 10:09:22 AM    Referred By: [email or web address]   SELF           Confirmed By:Vignesh Ignacio MD                                  Imaging Results              X-Ray Chest AP Portable (Final result)  Result time 11/21/22 09:21:13      Final result by Kolby Masters MD (11/21/22 09:21:13)                   Impression:      No acute chest disease is identified.      Electronically signed by: Kolby Masters  Date:    11/21/2022  Time:    09:21               Narrative:    EXAMINATION:  XR CHEST AP PORTABLE    CLINICAL HISTORY:  Chest Pain;, .    COMPARISON:  October 16, 2022    FINDINGS:  No alveolar consolidation, effusion, or pneumothorax is seen.   The thoracic aorta is normal  cardiac silhouette, central pulmonary vessels and mediastinum are normal in size and are grossly unremarkable.   visualized osseous structures are grossly unremarkable.                                       Medications   aspirin tablet 325 mg (325 mg Oral Given 11/21/22 0754)   acetaminophen tablet 1,000 mg (1,000 mg Oral Given 11/21/22 0754)   ketorolac tablet 10 mg (10 mg Oral Given 11/21/22 0754)     Medical Decision  Making:   Clinical Tests:   Lab Tests: Reviewed and Ordered  Radiological Study: Ordered and Reviewed  Medical Tests: Reviewed and Ordered  ED Management:  57-year-old female presents to ED for right chest discomfort.  States worsened after moving furniture 2 weeks ago.  Reports direct trauma to the chest at that time.  Worsened with palpation and arm movement.  No breast complaints.  On exam has significant reproducible muscular chest discomfort.  No deformity, breast normal.  Laboratory analysis including D-dimer, cardiac enzymes, EKG, chest x-ray, viral panel all grossly unremarkable.  Findings at this time most consistent with a muscle strain.  Will place on a muscle relaxer and pain medicine.  already has PCP follow-up scheduled for tomorrow. instructed to keep this appointment.  On discharge daughter bedside.  All questions answered.  Strict return precautions provided and released. (Mary)                        Clinical Impression:   Final diagnoses:  [S29.011A] Chest wall muscle strain, initial encounter (Primary)        ED Disposition Condition    Discharge Stable          ED Prescriptions       Medication Sig Dispense Start Date End Date Auth. Provider    baclofen (LIORESAL) 10 MG tablet Take 1 tablet (10 mg total) by mouth 3 (three) times daily. for 10 days 30 tablet 11/21/2022 12/1/2022 Jett Hernandez MD    ketorolac (TORADOL) 10 mg tablet Take 1 tablet (10 mg total) by mouth every 6 (six) hours as needed for Pain. 20 tablet 11/21/2022 11/26/2022 Jett Hernandez MD          Follow-up Information       Follow up With Specialties Details Why Contact Info    Cyndi Chilel, DUSTIN Family [facility] Go in 1 day  [address] [facility] [address]  [phone]      [facility] Emergency Medicine  As needed, If symptoms worsen [address] [facility] [id number] [phone]             Jett Hernandez MD  11/21/22 9608

## 2022-11-22 ENCOUNTER — OFFICE VISIT (OUTPATIENT)
Dept: INTERNAL MEDICINE | Facility: CLINIC | Age: 57
End: 2022-11-22
Payer: OTHER GOVERNMENT

## 2022-11-22 ENCOUNTER — CLINICAL SUPPORT (OUTPATIENT)
Dept: INTERNAL MEDICINE | Facility: CLINIC | Age: 57
End: 2022-11-22
Attending: NURSE PRACTITIONER
Payer: OTHER GOVERNMENT

## 2022-11-22 VITALS
SYSTOLIC BLOOD PRESSURE: 136 MMHG | DIASTOLIC BLOOD PRESSURE: 83 MMHG | HEIGHT: 67 IN | WEIGHT: 151.81 LBS | HEART RATE: 72 BPM | TEMPERATURE: 99 F | RESPIRATION RATE: 18 BRPM | BODY MASS INDEX: 23.83 KG/M2

## 2022-11-22 DIAGNOSIS — Z00.00 WELLNESS EXAMINATION: Primary | ICD-10-CM

## 2022-11-22 DIAGNOSIS — F17.210 CIGARETTE NICOTINE DEPENDENCE WITHOUT COMPLICATION: Chronic | ICD-10-CM

## 2022-11-22 DIAGNOSIS — T14.8XXA MUSCLE STRAIN: ICD-10-CM

## 2022-11-22 DIAGNOSIS — Z12.31 ENCOUNTER FOR SCREENING MAMMOGRAM FOR MALIGNANT NEOPLASM OF BREAST: ICD-10-CM

## 2022-11-22 DIAGNOSIS — E11.9 TYPE 2 DIABETES MELLITUS WITHOUT COMPLICATION, WITHOUT LONG-TERM CURRENT USE OF INSULIN: Chronic | ICD-10-CM

## 2022-11-22 DIAGNOSIS — E78.2 MIXED HYPERLIPIDEMIA: Chronic | ICD-10-CM

## 2022-11-22 DIAGNOSIS — Z12.11 ENCOUNTER FOR COLORECTAL CANCER SCREENING: ICD-10-CM

## 2022-11-22 DIAGNOSIS — R06.02 SOB (SHORTNESS OF BREATH): ICD-10-CM

## 2022-11-22 DIAGNOSIS — I10 PRIMARY HYPERTENSION: Chronic | ICD-10-CM

## 2022-11-22 DIAGNOSIS — Z12.12 ENCOUNTER FOR COLORECTAL CANCER SCREENING: ICD-10-CM

## 2022-11-22 DIAGNOSIS — Z13.5 DIABETIC RETINOPATHY SCREENING: Primary | ICD-10-CM

## 2022-11-22 DIAGNOSIS — Z13.5 DIABETIC RETINOPATHY SCREENING: ICD-10-CM

## 2022-11-22 PROCEDURE — 99406 BEHAV CHNG SMOKING 3-10 MIN: CPT | Mod: S$PBB,,, | Performed by: NURSE PRACTITIONER

## 2022-11-22 PROCEDURE — 99214 OFFICE O/P EST MOD 30 MIN: CPT | Mod: 25,S$PBB,, | Performed by: NURSE PRACTITIONER

## 2022-11-22 PROCEDURE — 99214 PR OFFICE/OUTPT VISIT, EST, LEVL IV, 30-39 MIN: ICD-10-PCS | Mod: 25,S$PBB,, | Performed by: NURSE PRACTITIONER

## 2022-11-22 PROCEDURE — 99215 OFFICE O/P EST HI 40 MIN: CPT | Mod: PBBFAC | Performed by: NURSE PRACTITIONER

## 2022-11-22 PROCEDURE — 99406 PR TOBACCO USE CESSATION INTERMEDIATE 3-10 MINUTES: ICD-10-PCS | Mod: S$PBB,,, | Performed by: NURSE PRACTITIONER

## 2022-11-22 RX ORDER — SIMVASTATIN 20 MG/1
20 TABLET, FILM COATED ORAL NIGHTLY
Qty: 90 TABLET | Refills: 2 | Status: SHIPPED | OUTPATIENT
Start: 2022-11-22 | End: 2023-07-25 | Stop reason: SDUPTHER

## 2022-11-22 RX ORDER — OMEPRAZOLE 40 MG/1
40 CAPSULE, DELAYED RELEASE ORAL EVERY MORNING
Qty: 90 CAPSULE | Refills: 2 | Status: SHIPPED | OUTPATIENT
Start: 2022-11-22 | End: 2023-07-25 | Stop reason: SDUPTHER

## 2022-11-22 RX ORDER — HYDROCHLOROTHIAZIDE 25 MG/1
25 TABLET ORAL DAILY
Qty: 90 TABLET | Refills: 2 | Status: SHIPPED | OUTPATIENT
Start: 2022-11-22 | End: 2023-07-25 | Stop reason: SDUPTHER

## 2022-11-22 RX ORDER — METFORMIN HYDROCHLORIDE 500 MG/1
500 TABLET ORAL 2 TIMES DAILY
Qty: 180 TABLET | Refills: 2 | Status: SHIPPED | OUTPATIENT
Start: 2022-11-22 | End: 2023-07-25 | Stop reason: SDUPTHER

## 2022-11-22 RX ORDER — POTASSIUM CHLORIDE 20 MEQ/1
20 TABLET, EXTENDED RELEASE ORAL DAILY
Qty: 90 TABLET | Refills: 2 | Status: SHIPPED | OUTPATIENT
Start: 2022-11-22 | End: 2023-07-25 | Stop reason: SDUPTHER

## 2022-11-22 RX ORDER — LOSARTAN POTASSIUM 50 MG/1
50 TABLET ORAL DAILY
Qty: 90 TABLET | Refills: 2 | Status: SHIPPED | OUTPATIENT
Start: 2022-11-22 | End: 2023-07-25 | Stop reason: SDUPTHER

## 2022-11-22 RX ORDER — ATENOLOL 25 MG/1
25 TABLET ORAL DAILY
Qty: 90 TABLET | Refills: 2 | Status: SHIPPED | OUTPATIENT
Start: 2022-11-22 | End: 2023-03-07 | Stop reason: SDUPTHER

## 2022-11-22 NOTE — ASSESSMENT & PLAN NOTE
Continue Simvastatin 20 mg daily  Weight loss encouraged  Low fat/high fiber diet  Increase physical activity  Tobacco cessation encouraged   Latest Reference Range & Units 11/16/22 06:52   Cholesterol <=200 mg/dL 160   HDL 35 - 60 mg/dL 65 (H)   LDL Cholesterol External 50.00 - 140.00 mg/dL 80.00   Total Cholesterol/HDL Ratio 0 - 5  2   Triglycerides 37 - 140 mg/dL 76   Very Low Density Lipoprotein  15

## 2022-11-22 NOTE — PROGRESS NOTES
Subjective:       Patient ID: Maria Luisa Boyle is a 57 y.o. female.    Chief Complaint: Follow-up (Continue to have chest/ muscle pain.) and Results    Patient has diagnosis of HTN, HLD, DM2, Tobacco use. Patient seen in clinic today for wellness exam. Patient last seen in clinic on 05/26/2022. Patient states she takes her medications as prescribed. Today patient states right chest wall pain. Seen in ED yesterday for same complaint. Patient prescribed Baclofen and Toradol, states pain did improve. CXR and EKG done in ED, both negative. Patient states SOB at times but relates it to her smoking. No PFTs noted. Patient currently on Albuterol inhaler prn but states she does take inhaler daily. Patient states trying to cut back on amount of cigarettes smoked per day, refuses smoking cessation program.      Patient followed by [facility] for right knee osteoarthritis. Last appointment on 03/08/2022. Patient received Euflexxa injection  On 03/08/2022, 03/18/2022, and 03/29/2022. Patient states mild relief. Patient to follow up as needed.       Mammogram: 03/10/2021, negative, ordered 11/22/2022  Pap: Hysterectomy  FIT: 11/02/2021, negative; Cologuard ordered 11/22/2022  Diabetic Eye Exam: ordered 11/22/2022  Diabetic Foot Exam: 11/22/2022  Lung Cancer Screening: ordered 11/22/2022  Bone Density: deferred due to age  Medicare Wellness: N/A    Review of Systems   Constitutional: Negative.    HENT: Negative.     Eyes: Negative.    Respiratory:  Positive for shortness of breath.    Cardiovascular: Negative.    Gastrointestinal: Negative.    Endocrine: Negative.    Genitourinary: Negative.    Musculoskeletal:  Positive for myalgias.   Integumentary:  Negative.   Allergic/Immunologic: Negative.    Neurological: Negative.    Hematological: Negative.    Psychiatric/Behavioral: Negative.         Objective:      Physical Exam  Vitals reviewed.   Constitutional:       Appearance: Normal appearance.   HENT:      Head:  Normocephalic and atraumatic.      Mouth/Throat:      Mouth: Mucous membranes are moist.      Pharynx: Oropharynx is clear.   Eyes:      Extraocular Movements: Extraocular movements intact.      Conjunctiva/sclera: Conjunctivae normal.      Pupils: Pupils are equal, round, and reactive to light.   Cardiovascular:      Rate and Rhythm: Normal rate and regular rhythm.      Pulses:           Dorsalis pedis pulses are 1+ on the right side and 1+ on the left side.      Heart sounds: Normal heart sounds.   Pulmonary:      Effort: Pulmonary effort is normal.      Breath sounds: Normal breath sounds.   Chest:       Abdominal:      General: Bowel sounds are normal.   Musculoskeletal:         General: Normal range of motion.      Cervical back: Normal range of motion.   Feet:      Right foot:      Protective Sensation: 9 sites tested.  7 sites sensed.      Skin integrity: Callus present.      Toenail Condition: Right toenails are normal.      Left foot:      Protective Sensation: 9 sites tested.  8 sites sensed.      Skin integrity: Callus present.      Toenail Condition: Left toenails are normal.   Skin:     General: Skin is warm and dry.   Neurological:      Mental Status: She is alert and oriented to person, place, and time.   Psychiatric:         Mood and Affect: Mood normal.         Behavior: Behavior normal.       Assessment:       Problem List Items Addressed This Visit          Pulmonary    SOB (shortness of breath)     CXR done 11/21/2022; negative  PFTs ordered  Continue Albuterol inhaler prn  Start Serevent inhaler bid         Relevant Orders    Pulmonary function test       Cardiac/Vascular    Hyperlipidemia (Chronic)     Continue Simvastatin 20 mg daily  Weight loss encouraged  Low fat/high fiber diet  Increase physical activity  Tobacco cessation encouraged   Latest Reference Range & Units 11/16/22 06:52   Cholesterol <=200 mg/dL 160   HDL 35 - 60 mg/dL 65 (H)   LDL Cholesterol External 50.00 - 140.00 mg/dL  80.00   Total Cholesterol/HDL Ratio 0 - 5  2   Triglycerides 37 - 140 mg/dL 76   Very Low Density Lipoprotein  15            Hypertension (Chronic)     B/P: 136/83  [id number]  Continue Atenolol 25 mg daily, HCTZ 25 mg daily, Losartan 50 mg daily  DASH diet  Encouraged home blood pressure monitoring   Latest Reference Range & Units 22 07:00   Creatinine, Urine 47.0 - 110.0 mg/dL 225.6 (H)   Microalbumin, Urine <=30.0 ug/ml 15.8               Endocrine    Diabetes mellitus (Chronic)     Continue Metformin 500 mg bid  Encouraged home CBG monitoring.  Hypoglycemic episodes: denies  BMI: 23.77  HgbA1c: 6.5  On Simvastatin  Weight Loss Encouraged  ADA Diet   Latest Reference Range & Units 22 07:00   Creatinine, Urine 47.0 - 110.0 mg/dL 225.6 (H)   Microalbumin, Urine <=30.0 ug/ml 15.8            Relevant Medications    metFORMIN (GLUCOPHAGE) 500 MG tablet       Orthopedic    Muscle strain     Continue Toradol, Baclofen as prescribed  Right arm sling ordered for patient, patient to notify clinic if no improvement in 1 week.          Relevant Orders    SLING FOR HOME USE       Other    Cigarette nicotine dependence without complication (Chronic)     Smoking cessation education provided, encouraged. Patient informed of smoking cessation program as well as options to assist with smoking cessation such as nicotine patches/lozenges as well as medications such as Wellbutrin. Patient refused smoking cessation at this time. I spent approx 5 minutes discussing smoking cessation with patient.          Relevant Orders    CT Chest Lung Screening Low Dose    Wellness examination - Primary     Health preventatives reviewed  Labs reviewed          Other Visit Diagnoses       Encounter for screening mammogram for malignant neoplasm of breast        Relevant Orders    Mammo Digital Screening Bilat w/ Dayron    Encounter for colorectal cancer screening        Relevant Orders    Cologuard Screening (Multitarget Stool DNA)     Diabetic retinopathy screening        Relevant Orders    Diabetic Eye Screening Photo            Plan:    Patient to follow up in 8 weeks to reassess right chest wall pain, SOB. Virtual Visit.

## 2022-11-22 NOTE — ASSESSMENT & PLAN NOTE
Smoking cessation education provided, encouraged. Patient informed of smoking cessation program as well as options to assist with smoking cessation such as nicotine patches/lozenges as well as medications such as Wellbutrin. Patient refused smoking cessation at this time. I spent approx 5 minutes discussing smoking cessation with patient.

## 2022-11-22 NOTE — ASSESSMENT & PLAN NOTE
CXR done 11/21/2022; negative  PFTs ordered  Continue Albuterol inhaler prn  Start Serevent inhaler bid

## 2022-11-22 NOTE — PROGRESS NOTES
Maria Luisa Boyle is a 57 y.o. female here for a diabetic eye screening with non-dilated fundus photos per PADMINI Harris.    Patient cooperative?: Yes  Small pupils?: No  Last eye exam: unknown    For exam results, see Encounter Report.

## 2022-11-22 NOTE — ASSESSMENT & PLAN NOTE
Continue Metformin 500 mg bid  Encouraged home CBG monitoring.  Hypoglycemic episodes: denies  BMI: 23.77  HgbA1c: 6.5  On Simvastatin  Weight Loss Encouraged  ADA Diet   Latest Reference Range & Units 11/16/22 07:00   Creatinine, Urine 47.0 - 110.0 mg/dL 225.6 (H)   Microalbumin, Urine <=30.0 ug/ml 15.8

## 2022-11-22 NOTE — ASSESSMENT & PLAN NOTE
B/P: 136/83  EK2022  Continue Atenolol 25 mg daily, HCTZ 25 mg daily, Losartan 50 mg daily  DASH diet  Encouraged home blood pressure monitoring   Latest Reference Range & Units 22 07:00   Creatinine, Urine 47.0 - 110.0 mg/dL 225.6 (H)   Microalbumin, Urine <=30.0 ug/ml 15.8

## 2022-11-22 NOTE — ASSESSMENT & PLAN NOTE
Continue Toradol, Baclofen as prescribed  Right arm sling ordered for patient, patient to notify clinic if no improvement in 1 week.

## 2022-12-12 ENCOUNTER — HOSPITAL ENCOUNTER (OUTPATIENT)
Dept: RADIOLOGY | Facility: HOSPITAL | Age: 57
Discharge: HOME | End: 2022-12-12
Attending: NURSE PRACTITIONER
Payer: OTHER GOVERNMENT

## 2022-12-12 DIAGNOSIS — F17.210 CIGARETTE NICOTINE DEPENDENCE WITHOUT COMPLICATION: ICD-10-CM

## 2022-12-12 PROCEDURE — 71271 CT THORAX LUNG CANCER SCR C-: CPT | Mod: TC

## 2022-12-27 ENCOUNTER — HOSPITAL ENCOUNTER (OUTPATIENT)
Dept: PULMONOLOGY | Facility: HOSPITAL | Age: 57
Discharge: HOME | End: 2022-12-27
Attending: NURSE PRACTITIONER
Payer: OTHER GOVERNMENT

## 2022-12-27 ENCOUNTER — HOSPITAL ENCOUNTER (OUTPATIENT)
Dept: RADIOLOGY | Facility: HOSPITAL | Age: 57
Discharge: HOME | End: 2022-12-27
Attending: NURSE PRACTITIONER
Payer: OTHER GOVERNMENT

## 2022-12-27 DIAGNOSIS — Z12.31 ENCOUNTER FOR SCREENING MAMMOGRAM FOR MALIGNANT NEOPLASM OF BREAST: ICD-10-CM

## 2022-12-27 DIAGNOSIS — R06.02 SOB (SHORTNESS OF BREATH): ICD-10-CM

## 2022-12-27 LAB
FEF 25 75 LLN: 1.08
FEF 25 75 PRE REF: 102.1 %
FEF 25 75 REF: 2.35
FEV1 FVC LLN: 69
FEV1 FVC PRE REF: 109.6 %
FEV1 FVC REF: 80
FEV1 LLN: 1.86
FEV1 PRE REF: 90 %
FEV1 REF: 2.5
FVC LLN: 2.37
FVC PRE REF: 81.6 %
FVC REF: 3.15
PEF LLN: 4.22
PEF PRE REF: 50.6 %
PEF REF: 6.42
PRE FEF 25 75: 2.4 L/S (ref 1.08–4.1)
PRE FET 100: 2.99 SEC
PRE FEV1 FVC: 87.64 % (ref 68.81–89.44)
PRE FEV1: 2.25 L (ref 1.86–3.12)
PRE FVC: 2.57 L (ref 2.37–3.96)
PRE PEF: 3.25 L/S (ref 4.22–8.62)

## 2022-12-27 PROCEDURE — 77063 MAMMO DIGITAL SCREENING BILAT WITH TOMO: ICD-10-PCS | Mod: 26,,, | Performed by: RADIOLOGY

## 2022-12-27 PROCEDURE — 77067 SCR MAMMO BI INCL CAD: CPT | Mod: TC

## 2022-12-27 PROCEDURE — 77067 MAMMO DIGITAL SCREENING BILAT WITH TOMO: ICD-10-PCS | Mod: 26,,, | Performed by: RADIOLOGY

## 2022-12-27 PROCEDURE — 77067 SCR MAMMO BI INCL CAD: CPT | Mod: 26,,, | Performed by: RADIOLOGY

## 2022-12-27 PROCEDURE — 77063 BREAST TOMOSYNTHESIS BI: CPT | Mod: TC

## 2022-12-27 PROCEDURE — 77063 BREAST TOMOSYNTHESIS BI: CPT | Mod: 26,,, | Performed by: RADIOLOGY

## 2022-12-27 NOTE — PROGRESS NOTES
This patient struggled and was unable to successfully perform the Pulmonary exercises.  Testing discontinued after maximal attempts were reached.

## 2023-01-18 ENCOUNTER — OFFICE VISIT (OUTPATIENT)
Dept: INTERNAL MEDICINE | Facility: CLINIC | Age: 58
End: 2023-01-18
Payer: OTHER GOVERNMENT

## 2023-01-18 DIAGNOSIS — I10 PRIMARY HYPERTENSION: Chronic | ICD-10-CM

## 2023-01-18 DIAGNOSIS — T14.8XXA MUSCLE STRAIN: ICD-10-CM

## 2023-01-18 DIAGNOSIS — F17.210 CIGARETTE NICOTINE DEPENDENCE WITHOUT COMPLICATION: Chronic | ICD-10-CM

## 2023-01-18 DIAGNOSIS — E11.65 TYPE 2 DIABETES MELLITUS WITH HYPERGLYCEMIA, WITHOUT LONG-TERM CURRENT USE OF INSULIN: Chronic | ICD-10-CM

## 2023-01-18 DIAGNOSIS — E78.2 MIXED HYPERLIPIDEMIA: Chronic | ICD-10-CM

## 2023-01-18 DIAGNOSIS — R06.02 SOB (SHORTNESS OF BREATH): Primary | ICD-10-CM

## 2023-01-18 PROCEDURE — 99214 OFFICE O/P EST MOD 30 MIN: CPT | Mod: FQ,95,, | Performed by: NURSE PRACTITIONER

## 2023-01-18 PROCEDURE — 99214 PR OFFICE/OUTPT VISIT, EST, LEVL IV, 30-39 MIN: ICD-10-PCS | Mod: FQ,95,, | Performed by: NURSE PRACTITIONER

## 2023-01-18 RX ORDER — IBUPROFEN 800 MG/1
800 TABLET ORAL 3 TIMES DAILY
Qty: 270 TABLET | Refills: 2 | Status: SHIPPED | OUTPATIENT
Start: 2023-01-18

## 2023-01-18 NOTE — PROGRESS NOTES
Patient ID: [id number]     Chief Complaint: Follow-up (1. States never received Serevent. 2. Need refill ibuprofen .)    HPI:     Established Patient - Audio Only Telehealth Visit     The patient location is: home  The chief complaint leading to consultation is: follow up muscle pain, SOB  Visit type: Virtual visit with audio only (telephone)  Total time spent with patient: 15 minutes    The reason for the audio only service rather than synchronous audio and video virtual visit was related to technical difficulties or patient preference/necessity.    Each patient to whom I provide medical services by telemedicine is:  (1) informed of the relationship between the physician and patient and the respective role of any other health care provider with respect to management of the patient; and (2) notified that they may decline to receive medical services by telemedicine and may withdraw from such care at any time. Patient verbally consented to receive this service via voice-only telephone call.    This service was not originating from a related E/M service provided within the previous 7 days nor will  to an E/M service or procedure within the next 24 hours or my soonest available appointment.  Prevailing standard of care was able to be met in this audio-only visit.      Maria Luisa Boyle is a 57 y.o. female with diagnosis of HTN, HLD, DM2, Tobacco Use. Patient seen in clinic today for follow up on muscle pain, SOB. Patient last seen in clinic on 11/22/2022. CXR and EKG completed in ED on 11/21/2022 due to chest wall pain, both negative. Patient prescribed Baclofen and Toradol, pain resolved. Patient also stated SOB, related it to her smoking. PFTs completed on 12/27/2022, awaiting interpretation. Patient currently on Albuterol inhaler prn, was prescribed Serevent but stated she never received medication from her pharmacy. Today, patient states SOB improved. Patient states she was using her Albuterol inhaler twice  weekly as needed. Patient is a current every day tobacco user.     Patient followed by Orthopedic Clinic for right knee osteoarthritis. Last appointment on 2022. Patient received Euflexxa injection  On 2022, 2022, and 2022. Patient states mild relief. Patient to follow up as needed.     Review of patient's allergies indicates:  No Known Allergies    Breast Cancer Screenin2022, negative  Cervical Cancer Screening: Hysterectomy  Colorectal Cancer Screening: FIT negative 2021; Cologuard ordered 2022  Diabetic Eye Exam: 2022  Diabetic Foot Exam: 2022  Lung Cancer Screenin2022, negative  AAA Screening: deferred due to age  Osteoporosis Screening: deferred due to age  Medicare Wellness: N/A  Immunizations:   Immunization History   Administered Date(s) Administered    COVID-19, MRNA, LN-S, PF (Pfizer) (Purple Cap) 2021, 2021       Past Surgical History:   Procedure Laterality Date    HYSTERECTOMY         family history includes Cancer in her father; Diabetes in her mother; Hyperlipidemia in her father and mother; Hypertension in her father and mother.    Social History     Socioeconomic History    Marital status:    Tobacco Use    Smoking status: Every Day     Packs/day: 1.00     Years: 35.00     Pack years: 35.00     Types: Cigarettes    Smokeless tobacco: Never    Tobacco comments:     6-7 PER DAY   Substance and Sexual Activity    Alcohol use: Not Currently     Comment: ocassionally wine/ liquor    Drug use: Never    Sexual activity: Yes     Partners: Male       Current Outpatient Medications   Medication Instructions    albuterol (PROVENTIL/VENTOLIN HFA) 90 mcg/actuation inhaler 1-2 puffs, Inhalation, Every 6 hours PRN, Rescue    atenoloL (TENORMIN) 25 mg, Oral, Daily    baclofen (LIORESAL) 10 mg, Oral, 3 times daily    hydroCHLOROthiazide (HYDRODIURIL) 25 mg, Oral, Daily    ibuprofen (ADVIL,MOTRIN) 800 mg, Oral, 3 times daily     ketoconazole (NIZORAL) 2 % cream Topical (Top), Daily    losartan (COZAAR) 50 mg, Oral, Daily    metFORMIN (GLUCOPHAGE) 500 mg, Oral, 2 times daily    omeprazole (PRILOSEC) 40 mg, Oral, Every morning    potassium chloride SA (K-DUR,KLOR-CON) 20 MEQ tablet 20 mEq, Oral, Daily    simvastatin (ZOCOR) 20 mg, Oral, Nightly       Subjective:     Review of Systems   Constitutional: Negative.    HENT: Negative.     Eyes: Negative.    Respiratory: Negative.     Cardiovascular: Negative.    Gastrointestinal: Negative.    Endocrine: Negative.    Genitourinary: Negative.    Musculoskeletal: Negative.    Skin: Negative.    Allergic/Immunologic: Negative.    Neurological: Negative.    Hematological: Negative.    Psychiatric/Behavioral: Negative.       Objective:     There were no vitals taken for this visit.    Physical Exam  Neurological:      Mental Status: She is alert and oriented to person, place, and time.   Psychiatric:         Mood and Affect: Mood normal.       Labs Reviewed:     Labs reviewed    Assessment:       ICD-10-CM ICD-9-CM   1. SOB (shortness of breath)  R06.02 786.05   2. Muscle strain  T14.8XXA 848.9   3. Mixed hyperlipidemia  E78.2 272.2   4. Primary hypertension  I10 401.9   5. Type 2 diabetes mellitus with hyperglycemia, without long-term current use of insulin  E11.65 250.00     790.29   6. Cigarette nicotine dependence without complication  F17.210 305.1        Plan:     1. SOB (shortness of breath)  Improved  Continue Albuterol Inh prn  Smoking cessation encouraged    2. Muscle strain  Resolved     3. Mixed hyperlipidemia  Continue Simvastatin 20 mg daily  Weight loss encouraged  Low fat/high fiber diet  Increase physical activity  Tobacco cessation encouraged  Cholesterol Total   Date Value Ref Range Status   11/16/2022 160 <=200 mg/dL Final     HDL Cholesterol   Date Value Ref Range Status   11/16/2022 65 (H) 35 - 60 mg/dL Final     Triglyceride   Date Value Ref Range Status   11/16/2022 76 37 - 140  mg/dL Final     LDL Cholesterol   Date Value Ref Range Status   2022 80.00 50.00 - 140.00 mg/dL Final   - Lipid Panel; Future    4. Primary hypertension  B/P: deferred due to age  EK2022  Continue HCTZ 25 mg daily, Losartan 50 mg daily  DASH diet  Encouraged home blood pressure monitoring    5. Type 2 diabetes mellitus with hyperglycemia, without long-term current use of insulin  Continue Metformin 500 mg bid  Encouraged home CBG monitoring.  Hypoglycemic episodes: denies  BMI: 23.77  HgbA1c: 6.5  Urine Creatinine: 225.6  Urine Microalbumin: 15.8  On Simvastatin  Weight Loss Encouraged  ADA Diet  - CBC Auto Differential; Future  - Comprehensive Metabolic Panel; Future  - Hemoglobin A1C; Future  - Urinalysis; Future  - Microalbumin/Creatinine Ratio, Urine; Future  - Urinalysis    6. Cigarette nicotine dependence without complication  Smoking cessation education provided, encouraged. Patient refused smoking cessation at this time.       Follow up in about 6 months (around 2023) for Labs. In addition to their scheduled follow up, the patient has also been instructed to follow up on as needed basis.     Cyndi Chilel, PADMINI

## 2023-02-15 ENCOUNTER — TELEPHONE (OUTPATIENT)
Dept: SMOKING CESSATION | Facility: CLINIC | Age: 58
End: 2023-02-15
Payer: OTHER GOVERNMENT

## 2023-02-15 NOTE — TELEPHONE ENCOUNTER
Contacted pt regarding rescheduling her cancelled [facility] appt.  Called and spoke with pt.  Pt stated that this is not a good time and she will call us back to reschedule.  Pt was given our contact information.

## 2023-02-27 PROBLEM — Z00.00 WELLNESS EXAMINATION: Status: RESOLVED | Noted: 2022-11-22 | Resolved: 2023-02-27

## 2023-03-06 RX ORDER — ATENOLOL 25 MG/1
25 TABLET ORAL DAILY
Qty: 90 TABLET | Refills: 2 | OUTPATIENT
Start: 2023-03-06

## 2023-03-06 RX ORDER — HYDROCHLOROTHIAZIDE 25 MG/1
25 TABLET ORAL DAILY
Qty: 90 TABLET | Refills: 2 | OUTPATIENT
Start: 2023-03-06

## 2023-03-06 RX ORDER — LOSARTAN POTASSIUM 50 MG/1
50 TABLET ORAL DAILY
Qty: 90 TABLET | Refills: 2 | OUTPATIENT
Start: 2023-03-06

## 2023-03-06 NOTE — TELEPHONE ENCOUNTER
ON 11/22/2022, VERONICA Chilel sent in 90 day supply with 2 refills, please have patient call their pharmacy.    Thank you,    EMMA Cantu  English

## 2023-05-18 ENCOUNTER — PATIENT MESSAGE (OUTPATIENT)
Dept: ADMINISTRATIVE | Facility: HOSPITAL | Age: 58
End: 2023-05-18
Payer: OTHER GOVERNMENT

## 2023-07-13 ENCOUNTER — LAB VISIT (OUTPATIENT)
Dept: LAB | Facility: HOSPITAL | Age: 58
End: 2023-07-13
Attending: NURSE PRACTITIONER
Payer: OTHER GOVERNMENT

## 2023-07-13 DIAGNOSIS — E11.65 TYPE 2 DIABETES MELLITUS WITH HYPERGLYCEMIA, WITHOUT LONG-TERM CURRENT USE OF INSULIN: Chronic | ICD-10-CM

## 2023-07-13 LAB
APPEARANCE UR: CLEAR
BACTERIA #/AREA URNS AUTO: ABNORMAL /HPF
BILIRUB UR QL STRIP.AUTO: NEGATIVE
COLOR UR: YELLOW
CREAT UR-MCNC: 213.3 MG/DL (ref 45–106)
GLUCOSE UR QL STRIP.AUTO: NORMAL
HYALINE CASTS #/AREA URNS LPF: ABNORMAL /LPF
KETONES UR QL STRIP.AUTO: NEGATIVE
LEUKOCYTE ESTERASE UR QL STRIP.AUTO: NEGATIVE
MICROALBUMIN UR-MCNC: 31.5 UG/ML
MICROALBUMIN/CREAT RATIO PNL UR: 14.8 MG/GM CR (ref 0–30)
MUCOUS THREADS URNS QL MICRO: ABNORMAL /LPF
NITRITE UR QL STRIP.AUTO: NEGATIVE
PH UR STRIP.AUTO: 6 [PH]
PROT UR QL STRIP.AUTO: ABNORMAL
RBC #/AREA URNS AUTO: ABNORMAL /HPF
RBC UR QL AUTO: NEGATIVE
SP GR UR STRIP.AUTO: 1.03
SQUAMOUS #/AREA URNS LPF: ABNORMAL /HPF
UROBILINOGEN UR STRIP-ACNC: NORMAL
WBC #/AREA URNS AUTO: ABNORMAL /HPF

## 2023-07-13 PROCEDURE — 82043 UR ALBUMIN QUANTITATIVE: CPT

## 2023-07-25 ENCOUNTER — OFFICE VISIT (OUTPATIENT)
Dept: INTERNAL MEDICINE | Facility: CLINIC | Age: 58
End: 2023-07-25
Payer: OTHER GOVERNMENT

## 2023-07-25 VITALS
SYSTOLIC BLOOD PRESSURE: 139 MMHG | HEART RATE: 56 BPM | TEMPERATURE: 98 F | HEIGHT: 67 IN | BODY MASS INDEX: 26.47 KG/M2 | DIASTOLIC BLOOD PRESSURE: 86 MMHG | RESPIRATION RATE: 18 BRPM | WEIGHT: 168.63 LBS

## 2023-07-25 DIAGNOSIS — E78.2 MIXED HYPERLIPIDEMIA: Chronic | ICD-10-CM

## 2023-07-25 DIAGNOSIS — H00.15 CHALAZION OF LEFT LOWER EYELID: ICD-10-CM

## 2023-07-25 DIAGNOSIS — I10 PRIMARY HYPERTENSION: Primary | Chronic | ICD-10-CM

## 2023-07-25 DIAGNOSIS — F17.210 CIGARETTE NICOTINE DEPENDENCE WITHOUT COMPLICATION: Chronic | ICD-10-CM

## 2023-07-25 DIAGNOSIS — Z12.31 ENCOUNTER FOR SCREENING MAMMOGRAM FOR MALIGNANT NEOPLASM OF BREAST: ICD-10-CM

## 2023-07-25 DIAGNOSIS — E11.65 TYPE 2 DIABETES MELLITUS WITH HYPERGLYCEMIA, WITHOUT LONG-TERM CURRENT USE OF INSULIN: Chronic | ICD-10-CM

## 2023-07-25 PROCEDURE — 99214 PR OFFICE/OUTPT VISIT, EST, LEVL IV, 30-39 MIN: ICD-10-PCS | Mod: S$PBB,,, | Performed by: NURSE PRACTITIONER

## 2023-07-25 PROCEDURE — 99214 OFFICE O/P EST MOD 30 MIN: CPT | Mod: S$PBB,,, | Performed by: NURSE PRACTITIONER

## 2023-07-25 PROCEDURE — 99215 OFFICE O/P EST HI 40 MIN: CPT | Mod: PBBFAC | Performed by: NURSE PRACTITIONER

## 2023-07-25 RX ORDER — METFORMIN HYDROCHLORIDE 500 MG/1
500 TABLET ORAL 2 TIMES DAILY
Qty: 180 TABLET | Refills: 2 | Status: SHIPPED | OUTPATIENT
Start: 2023-07-25

## 2023-07-25 RX ORDER — LOSARTAN POTASSIUM 50 MG/1
50 TABLET ORAL DAILY
Qty: 90 TABLET | Refills: 2 | Status: SHIPPED | OUTPATIENT
Start: 2023-07-25

## 2023-07-25 RX ORDER — SIMVASTATIN 20 MG/1
20 TABLET, FILM COATED ORAL NIGHTLY
Qty: 90 TABLET | Refills: 2 | Status: SHIPPED | OUTPATIENT
Start: 2023-07-25

## 2023-07-25 RX ORDER — HYDROCHLOROTHIAZIDE 25 MG/1
25 TABLET ORAL DAILY
Qty: 90 TABLET | Refills: 2 | Status: SHIPPED | OUTPATIENT
Start: 2023-07-25

## 2023-07-25 RX ORDER — OMEPRAZOLE 40 MG/1
40 CAPSULE, DELAYED RELEASE ORAL EVERY MORNING
Qty: 90 CAPSULE | Refills: 2 | Status: SHIPPED | OUTPATIENT
Start: 2023-07-25

## 2023-07-25 RX ORDER — POTASSIUM CHLORIDE 20 MEQ/1
20 TABLET, EXTENDED RELEASE ORAL DAILY
Qty: 90 TABLET | Refills: 2 | Status: SHIPPED | OUTPATIENT
Start: 2023-07-25

## 2023-07-25 NOTE — PROGRESS NOTES
Patient ID: [id number]     Chief Complaint: Follow-up (1. Weight gain  2. Cyst/ bump to side of left eye.)    HPI:     Maria Luisa Boyle is a 57 y.o. female with diagnosis of HTN, HLD, DM2, Tobacco Use. Patient seen in clinic today for follow up. Patient last seen in clinic on 2023.  CXR and EKG completed in ED on 2022 due to chest wall pain, both negative. Patient prescribed Baclofen and Toradol, pain resolved. Patient also stated SOB, related it to her smoking. PFTs completed on 2022, awaiting interpretation. Patient prescribed Albuterol inhaler prn and Serevent but never picked up medication, patient states she doesn't need them.   Today, patient denies chest pain, SOB. States she cut down on smoking cigarettes. Noticed increased appetite, weight gain since decrease in smoking. 17 lb weight gain noted in 8 months.   Today, patient states bump to left eye, painful. Patient denies injury. States had same bump years ago but it is now getting bigger. Patient denies change in vision, eye pain.   Patient denies any other acute complaints.      Patient followed by Orthopedic Clinic for right knee osteoarthritis. Last appointment on 2022. Patient received Euflexxa injection  On 2022, 2022, and 2022. Patient states mild relief. Patient to follow up as needed.      Review of patient's allergies indicates:  No Known Allergies    Breast Cancer Screening: MMG negative on 2022; ordered 2023  Cervical Cancer Screening: Hysterectomy  Colorectal Cancer Screening: FIT negative 2021; Cologuard ordered 2022  Diabetic Eye Exam: 2022  Diabetic Foot Exam: 2022  Lung Cancer Screenin2022, negative  Prostate Cancer Screening: N/A  AAA Screening: N/A  Osteoporosis Screening: deferred due to age  Medicare Wellness: N/A  Immunizations:   Immunization History   Administered Date(s) Administered    COVID-19, MRNA, LN-S, PF (Pfizer) (Purple Cap) 2021,  "04/07/2021     Past Surgical History:   Procedure Laterality Date    HYSTERECTOMY       family history includes Cancer in her father; Diabetes in her mother; Hyperlipidemia in her father and mother; Hypertension in her father and mother.    Social History     Socioeconomic History    Marital status:    Tobacco Use    Smoking status: Every Day     Years: 35.00     Types: Cigarettes    Smokeless tobacco: Never    Tobacco comments:     2  PER DAY   Substance and Sexual Activity    Alcohol use: Not Currently     Comment: ocassionally wine/ liquor    Drug use: Never    Sexual activity: Yes     Partners: Male     Current Outpatient Medications   Medication Instructions    atenoloL (TENORMIN) 25 MG tablet TAKE 1 TABLET BY MOUTH DAILY    hydroCHLOROthiazide (HYDRODIURIL) 25 mg, Oral, Daily    ibuprofen (ADVIL,MOTRIN) 800 mg, Oral, 3 times daily    losartan (COZAAR) 50 mg, Oral, Daily    metFORMIN (GLUCOPHAGE) 500 mg, Oral, 2 times daily    omeprazole (PRILOSEC) 40 mg, Oral, Every morning    potassium chloride SA (K-DUR,KLOR-CON) 20 MEQ tablet 20 mEq, Oral, Daily    simvastatin (ZOCOR) 20 mg, Oral, Nightly       Subjective:     Review of Systems   Constitutional: Negative.    HENT: Negative.     Eyes: Negative.    Respiratory: Negative.     Cardiovascular: Negative.    Gastrointestinal: Negative.    Endocrine: Negative.    Genitourinary: Negative.    Musculoskeletal: Negative.    Skin:  Positive for wound.   Allergic/Immunologic: Negative.    Neurological: Negative.    Hematological: Negative.    Psychiatric/Behavioral: Negative.       Objective:     Visit Vitals  /86 (BP Location: Left arm, Patient Position: Sitting, BP Method: Large (Automatic))   Pulse (!) 56   Temp 98.1 °F (36.7 °C) (Oral)   Resp 18   Ht 5' 7.01" (1.702 m)   Wt 76.5 kg (168 lb 9.6 oz)   BMI 26.40 kg/m²       Physical Exam  Vitals reviewed.   Constitutional:       Appearance: Normal appearance.   HENT:      Head: Normocephalic and atraumatic. "      Mouth/Throat:      Mouth: Mucous membranes are moist.      Pharynx: Oropharynx is clear.   Eyes:      Extraocular Movements: Extraocular movements intact.      Conjunctiva/sclera: Conjunctivae normal.      Pupils: Pupils are equal, round, and reactive to light.     Cardiovascular:      Rate and Rhythm: Normal rate and regular rhythm.      Heart sounds: Normal heart sounds.   Pulmonary:      Effort: Pulmonary effort is normal.      Breath sounds: Normal breath sounds.   Abdominal:      General: Bowel sounds are normal.   Musculoskeletal:         General: Normal range of motion.      Cervical back: Normal range of motion.   Skin:     General: Skin is warm and dry.   Neurological:      Mental Status: She is alert and oriented to person, place, and time.   Psychiatric:         Mood and Affect: Mood normal.         Behavior: Behavior normal.         Labs Reviewed:     Hematology:  Lab Results   Component Value Date    WBC 4.10 (L) 07/13/2023    HGB 12.7 07/13/2023    HCT 39.6 07/13/2023     07/13/2023     Chemistry:  Lab Results   Component Value Date     07/13/2023    K 3.2 (L) 07/13/2023    CHLORIDE 103 07/13/2023    BUN 12.2 07/13/2023    CREATININE 0.77 07/13/2023    EGFRNORACEVR >60 07/13/2023    GLUCOSE 151 (H) 07/13/2023    CALCIUM 9.9 07/13/2023    ALKPHOS 52 07/13/2023    LABPROT 6.6 07/13/2023    ALBUMIN 4.1 07/13/2023    BILIDIR 0.2 10/21/2021    IBILI 0.20 10/21/2021    AST 19 07/13/2023    ALT 21 07/13/2023    MG 1.00 (L) 10/16/2022     Lab Results   Component Value Date    HGBA1C 6.1 07/13/2023     Lipid Panel:  Lab Results   Component Value Date    CHOL 158 07/13/2023    HDL 55 07/13/2023    LDL 86.00 07/13/2023    TRIG 84 07/13/2023    TOTALCHOLEST 3 07/13/2023     Thyroid:  Lab Results   Component Value Date    TSH 1.7202 11/16/2022     Urine:  Lab Results   Component Value Date    COLORUA Yellow 07/13/2023    APPEARANCEUA Clear 07/13/2023    SGUA 1.031 07/13/2023    PHUA 6.0 07/13/2023     PROTEINUA Trace (A) 07/13/2023    GLUCOSEUA Normal 07/13/2023    KETONESUA Negative 07/13/2023    BLOODUA Negative 07/13/2023    NITRITESUA Negative 07/13/2023    LEUKOCYTESUR Negative 07/13/2023    RBCUA 0-5 07/13/2023    WBCUA 0-5 07/13/2023    BACTERIA None Seen 07/13/2023    SQEPUA Few (A) 07/13/2023    HYALINECASTS None Seen 07/13/2023    CREATRANDUR 213.3 (H) 07/13/2023        Assessment:       ICD-10-CM ICD-9-CM   1. Primary hypertension  I10 401.9   2. Mixed hyperlipidemia  E78.2 272.2   3. Type 2 diabetes mellitus with hyperglycemia, without long-term current use of insulin  E11.65 250.00     790.29   4. Cigarette nicotine dependence without complication  F17.210 305.1   5. Chalazion of left lower eyelid  H00.15 373.2   6. Encounter for screening mammogram for malignant neoplasm of breast  Z12.31 V76.12        Plan:     1. Primary hypertension  Vitals:    07/25/23 0813   BP: 139/86   Pulse: (!) 56   Resp: 18   Temp: 98.1 °F (36.7 °C)      Results for orders placed or performed in visit on 07/13/23   Microalbumin/Creatinine Ratio, Urine   Result Value Ref Range    Urine Microalbumin 31.5 (H) <=30.0 ug/ml    Urine Creatinine 213.3 (H) 45.0 - 106.0 mg/dL    Microalbumin Creatinine Ratio 14.8 0.0 - 30.0 mg/gm Cr     Results for orders placed or performed during the hospital encounter of 11/21/22   EKG 12-lead    Collection Time: 11/21/22  8:07 AM    Narrative    Test Reason : R07.9,    Vent. Rate : 061 BPM     Atrial Rate : 061 BPM     P-R Int : 120 ms          QRS Dur : 080 ms      QT Int : 426 ms       P-R-T Axes : 067 075 056 degrees     QTc Int : 428 ms    Normal sinus rhythm with sinus arrhythmia  Normal ECG  When compared with ECG of 16-OCT-2022 06:24,  No significant change was found  Confirmed by Vignesh Ignacio MD ([id number]) on 11/21/2022 10:09:22 AM    Referred By: JASMIN   SELF           Confirmed By:Vignesh Ignacio MD   Continue Losartan 50 mg daily, HCTZ 25 mg daily, Atenolol 25 mg daily  DASH  diet  Encouraged home blood pressure monitoring  - CBC Auto Differential; Future  - Comprehensive Metabolic Panel; Future  - Urinalysis; Future  - Microalbumin/Creatinine Ratio, Urine; Future  - Urinalysis    2. Mixed hyperlipidemia  Continue Simvastatin 20 mg Qhs  Weight loss encouraged  Low fat/high fiber diet  Increase physical activity  Tobacco cessation encouraged  Cholesterol Total   Date Value Ref Range Status   07/13/2023 158 <=200 mg/dL Final     HDL Cholesterol   Date Value Ref Range Status   07/13/2023 55 35 - 60 mg/dL Final     Triglyceride   Date Value Ref Range Status   07/13/2023 84 37 - 140 mg/dL Final     LDL Cholesterol   Date Value Ref Range Status   07/13/2023 86.00 50.00 - 140.00 mg/dL Final   - Lipid Panel; Future    3. Type 2 diabetes mellitus with hyperglycemia, without long-term current use of insulin  Continue Metformin 500 mg bid  Encouraged home CBG monitoring.  Hypoglycemic episodes: denies  Body mass index is 26.4 kg/m².  Hemoglobin A1c   Date Value Ref Range Status   07/13/2023 6.1 <=7.0 % Final     Results for orders placed or performed in visit on 07/13/23   Microalbumin/Creatinine Ratio, Urine   Result Value Ref Range    Urine Microalbumin 31.5 (H) <=30.0 ug/ml    Urine Creatinine 213.3 (H) 45.0 - 106.0 mg/dL    Microalbumin Creatinine Ratio 14.8 0.0 - 30.0 mg/gm Cr   On Simvastatin  On Losartan  Weight Loss Encouraged  ADA Diet  - Hemoglobin A1C; Future    4. Cigarette nicotine dependence without complication  Smoking cessation education provided, encouraged. Informed of smoking cessation program. Patient refused smoking cessation at this time. I spent 3 minutes discussing smoking cessation with patient.     5. Chalazion of left lower eyelid  Referral to Ophthalmology Clinic  - Ambulatory referral/consult to Ophthalmology; Future    6. Encounter for screening mammogram for malignant neoplasm of breast  - Mammo Digital Screening Bilat w/ Dayron; Future      Follow up in about 6 months  (around 1/25/2024) for Labs. In addition to their scheduled follow up, the patient has also been instructed to follow up on as needed basis.     Cyndi Chilel, PADMINI

## 2023-09-06 ENCOUNTER — OFFICE VISIT (OUTPATIENT)
Dept: OPHTHALMOLOGY | Facility: CLINIC | Age: 58
End: 2023-09-06
Payer: OTHER GOVERNMENT

## 2023-09-06 VITALS — HEIGHT: 67 IN | WEIGHT: 168 LBS | BODY MASS INDEX: 26.37 KG/M2

## 2023-09-06 DIAGNOSIS — D23.9 HYDROCYSTOMA: ICD-10-CM

## 2023-09-06 PROCEDURE — 99213 OFFICE O/P EST LOW 20 MIN: CPT | Mod: PBBFAC,PO | Performed by: STUDENT IN AN ORGANIZED HEALTH CARE EDUCATION/TRAINING PROGRAM

## 2023-09-06 NOTE — PROGRESS NOTES
HPI    Patient states that she has a bump on her lower left eye lid that she has   had for several years but recently she noticed it is hard and she is   concerned about it   Her last A1C on 7/13/2023 was 6.1  Last edited by Tyra Sharif MA on 9/6/2023 12:11 PM.            Assessment /Plan     For exam results, see Encounter Report.    Chalazion of left lower eyelid  -     Ambulatory referral/consult to Ophthalmology      Hydrocystoma of left lower eyelid  - slow growing over last 5 years  - plan for excision with local anesthetic on 9/27/23    RTC for removal 9/27/23  And DFE.

## 2023-09-25 ENCOUNTER — TELEPHONE (OUTPATIENT)
Dept: OPHTHALMOLOGY | Facility: CLINIC | Age: 58
End: 2023-09-25
Payer: OTHER GOVERNMENT

## 2023-09-27 ENCOUNTER — CLINICAL SUPPORT (OUTPATIENT)
Dept: OPHTHALMOLOGY | Facility: CLINIC | Age: 58
End: 2023-09-27
Payer: OTHER GOVERNMENT

## 2023-09-27 VITALS — BODY MASS INDEX: 26.37 KG/M2 | HEIGHT: 67 IN | WEIGHT: 168 LBS

## 2023-09-27 DIAGNOSIS — D23.9 HYDROCYSTOMA: Primary | ICD-10-CM

## 2023-09-27 PROCEDURE — 88305 TISSUE EXAM BY PATHOLOGIST: CPT | Mod: TC | Performed by: OPHTHALMOLOGY

## 2023-09-27 PROCEDURE — 99213 OFFICE O/P EST LOW 20 MIN: CPT | Mod: PBBFAC,PN | Performed by: STUDENT IN AN ORGANIZED HEALTH CARE EDUCATION/TRAINING PROGRAM

## 2023-09-27 PROCEDURE — 67810 INCAL BX EYELID SKN LID MRGN: CPT | Mod: PBBFAC,PN,LT | Performed by: STUDENT IN AN ORGANIZED HEALTH CARE EDUCATION/TRAINING PROGRAM

## 2023-09-27 NOTE — PROGRESS NOTES
HPI     Hydrocystoma     Additional comments: Excision of Hydrocystoma of left lower eyelid           Comments    Hydrocystoma          Last edited by Cynthia Vail MA on 9/27/2023  8:56 AM.            Assessment /Plan     For exam results, see Encounter Report.    There are no diagnoses linked to this encounter.    Hydrocystoma of left lower eyelid  - s/p removal 9/27/23  - RBA discussed, consent signed, see procedure note below for details.  - specimen sent to pathology  - RTC 1 week for lid check and DFE    Procedure: Eyelid Lesion Excision, Left Lower Eyelid  Patient was laid supine and prepped and draped in sterile fashion. 2% lidocaine with epinephrine was injected into the subcutaneous tissue surrounding the lesion. The lesion was carefully dissected using blunt Inge and 0.3 forceps and placed in formaldehyde for pathology.

## 2023-09-28 ENCOUNTER — TELEPHONE (OUTPATIENT)
Dept: OPHTHALMOLOGY | Facility: CLINIC | Age: 58
End: 2023-09-28
Payer: OTHER GOVERNMENT

## 2023-09-28 NOTE — TELEPHONE ENCOUNTER
Post procedure follow up call, Patient states she is doing well, verbalizes understanding of post procedure instructions. Aware to call clinic with any questions or concerns.

## 2023-10-01 LAB
ESTROGEN SERPL-MCNC: NORMAL PG/ML
INSULIN SERPL-ACNC: NORMAL U[IU]/ML
LAB AP CLINICAL INFORMATION: NORMAL
LAB AP GROSS DESCRIPTION: NORMAL
LAB AP REPORT FOOTNOTES: NORMAL
T3RU NFR SERPL: NORMAL %

## 2023-10-03 ENCOUNTER — OFFICE VISIT (OUTPATIENT)
Dept: OPHTHALMOLOGY | Facility: CLINIC | Age: 58
End: 2023-10-03
Payer: OTHER GOVERNMENT

## 2023-10-03 VITALS — BODY MASS INDEX: 26.21 KG/M2 | HEIGHT: 67 IN | WEIGHT: 167 LBS

## 2023-10-03 DIAGNOSIS — D23.9 HYDROCYSTOMA: Primary | ICD-10-CM

## 2023-10-03 PROCEDURE — 99213 OFFICE O/P EST LOW 20 MIN: CPT | Mod: PBBFAC,PN

## 2023-10-03 NOTE — PROGRESS NOTES
HPI    RTC 1 week for lid check and DFE  Patient states that she is doing well since her procedure, she states that   she doesn't want to be dilated today and can dilate on her next visit when   she can bring her so with her   Last edited by Tyra Sharif MA on 10/3/2023 11:44 AM.            Assessment /Plan     Benign Hydrocystoma of left lower eyelid  - s/p removal 9/27/23  - Doing well today  - pathology showed benign hydrocystoma   - Wants to postpone DFE until she can have  with her    2. T2DM  - annual DFE and Fundus photos    RTC 4mo diabetes screen DFE, OCT mac      There are no diagnoses linked to this encounter.  Aidan Acosta MD  [employer] Ophthal[facility] PGY-2

## 2023-10-10 ENCOUNTER — TELEPHONE (OUTPATIENT)
Dept: OPHTHALMOLOGY | Facility: CLINIC | Age: 58
End: 2023-10-10
Payer: OTHER GOVERNMENT

## 2023-10-10 NOTE — TELEPHONE ENCOUNTER
----- Message from Suma Avendano sent at 10/9/2023 11:24 AM CDT -----  Regarding: Eye pain  Contact: Maria Luisa  Patient called to speak with nurse he states he had a cyst removed and now he's having some discomfort. Very sanative to light and red     PO 10/03/23    Please give him a call [phone]

## 2023-11-20 DIAGNOSIS — E11.65 TYPE 2 DIABETES MELLITUS WITH HYPERGLYCEMIA, WITHOUT LONG-TERM CURRENT USE OF INSULIN: Primary | ICD-10-CM

## 2023-11-20 RX ORDER — METFORMIN HYDROCHLORIDE 500 MG/1
500 TABLET ORAL 2 TIMES DAILY
Qty: 180 TABLET | Refills: 2 | OUTPATIENT
Start: 2023-11-20

## 2023-11-20 NOTE — TELEPHONE ENCOUNTER
Pt request refill for Metformin ,last seen 7/25/23 next appt is 1/25/24. Wants it sent to a new pharmacy : [facility] in [place] on [address]changed in chart

## 2023-12-04 NOTE — TELEPHONE ENCOUNTER
Pt called requesting refill on Atenolol, Losartan and HCTZ. I informed pt we sent 90 day rx with 2 refills in July. Pt states [facility] told her she was out of refills. I called [facility] and they state the rx's have been closed out and coulnt tell me why or how that happened but said in order to fill they would need a new rx for them. LOV was on 7/25/23, has appt scheduled for 1/25/24

## 2023-12-08 RX ORDER — LOSARTAN POTASSIUM 50 MG/1
50 TABLET ORAL DAILY
Qty: 90 TABLET | Refills: 2 | OUTPATIENT
Start: 2023-12-08

## 2023-12-08 RX ORDER — ATENOLOL 25 MG/1
25 TABLET ORAL DAILY
Qty: 90 TABLET | Refills: 2 | OUTPATIENT
Start: 2023-12-08

## 2023-12-08 RX ORDER — HYDROCHLOROTHIAZIDE 25 MG/1
25 TABLET ORAL DAILY
Qty: 90 TABLET | Refills: 2 | OUTPATIENT
Start: 2023-12-08

## 2024-01-03 ENCOUNTER — HOSPITAL ENCOUNTER (OUTPATIENT)
Dept: RADIOLOGY | Facility: HOSPITAL | Age: 59
Discharge: HOME | End: 2024-01-03
Attending: NURSE PRACTITIONER
Payer: OTHER GOVERNMENT

## 2024-01-03 DIAGNOSIS — Z12.31 ENCOUNTER FOR SCREENING MAMMOGRAM FOR MALIGNANT NEOPLASM OF BREAST: ICD-10-CM

## 2024-01-03 PROCEDURE — 77067 SCR MAMMO BI INCL CAD: CPT | Mod: TC

## 2024-01-03 PROCEDURE — 77063 BREAST TOMOSYNTHESIS BI: CPT | Mod: 26,,, | Performed by: RADIOLOGY

## 2024-01-03 PROCEDURE — 77067 SCR MAMMO BI INCL CAD: CPT | Mod: 26,,, | Performed by: RADIOLOGY

## 2024-01-19 ENCOUNTER — LAB VISIT (OUTPATIENT)
Dept: LAB | Facility: HOSPITAL | Age: 59
End: 2024-01-19
Attending: NURSE PRACTITIONER
Payer: OTHER GOVERNMENT

## 2024-01-19 DIAGNOSIS — E11.65 TYPE 2 DIABETES MELLITUS WITH HYPERGLYCEMIA, WITHOUT LONG-TERM CURRENT USE OF INSULIN: Chronic | ICD-10-CM

## 2024-01-19 DIAGNOSIS — E78.2 MIXED HYPERLIPIDEMIA: Chronic | ICD-10-CM

## 2024-01-19 DIAGNOSIS — I10 PRIMARY HYPERTENSION: ICD-10-CM

## 2024-01-19 LAB
ALBUMIN SERPL-MCNC: 3.9 G/DL (ref 3.5–5)
ALBUMIN/GLOB SERPL: 1.6 RATIO (ref 1.1–2)
ALP SERPL-CCNC: 56 UNIT/L (ref 40–150)
ALT SERPL-CCNC: 17 UNIT/L (ref 0–55)
AST SERPL-CCNC: 17 UNIT/L (ref 5–34)
BASOPHILS # BLD AUTO: 0.02 X10(3)/MCL
BASOPHILS NFR BLD AUTO: 0.4 %
BILIRUB SERPL-MCNC: 0.4 MG/DL
BUN SERPL-MCNC: 13.2 MG/DL (ref 9.8–20.1)
CALCIUM SERPL-MCNC: 10 MG/DL (ref 8.4–10.2)
CHLORIDE SERPL-SCNC: 103 MMOL/L (ref 98–107)
CHOLEST SERPL-MCNC: 151 MG/DL
CHOLEST/HDLC SERPL: 3 {RATIO} (ref 0–5)
CO2 SERPL-SCNC: 30 MMOL/L (ref 22–29)
CREAT SERPL-MCNC: 0.75 MG/DL (ref 0.55–1.02)
CREAT UR-MCNC: 122.5 MG/DL (ref 45–106)
EOSINOPHIL # BLD AUTO: 0.1 X10(3)/MCL (ref 0–0.9)
EOSINOPHIL NFR BLD AUTO: 2.1 %
ERYTHROCYTE [DISTWIDTH] IN BLOOD BY AUTOMATED COUNT: 12.7 % (ref 11.5–17)
EST. AVERAGE GLUCOSE BLD GHB EST-MCNC: 145.6 MG/DL
GFR SERPLBLD CREATININE-BSD FMLA CKD-EPI: >60 MLS/MIN/1.73/M2
GLOBULIN SER-MCNC: 2.4 GM/DL (ref 2.4–3.5)
GLUCOSE SERPL-MCNC: 156 MG/DL (ref 74–100)
HBA1C MFR BLD: 6.7 %
HCT VFR BLD AUTO: 38.4 % (ref 37–47)
HDLC SERPL-MCNC: 50 MG/DL (ref 35–60)
HGB BLD-MCNC: 12.3 G/DL (ref 12–16)
IMM GRANULOCYTES # BLD AUTO: 0.01 X10(3)/MCL (ref 0–0.04)
IMM GRANULOCYTES NFR BLD AUTO: 0.2 %
LDLC SERPL CALC-MCNC: 92 MG/DL (ref 50–140)
LYMPHOCYTES # BLD AUTO: 1.86 X10(3)/MCL (ref 0.6–4.6)
LYMPHOCYTES NFR BLD AUTO: 39.2 %
MCH RBC QN AUTO: 29.7 PG (ref 27–31)
MCHC RBC AUTO-ENTMCNC: 32 G/DL (ref 33–36)
MCV RBC AUTO: 92.8 FL (ref 80–94)
MICROALBUMIN UR-MCNC: 11.7 UG/ML
MICROALBUMIN/CREAT RATIO PNL UR: 9.6 MG/GM CR (ref 0–30)
MONOCYTES # BLD AUTO: 0.36 X10(3)/MCL (ref 0.1–1.3)
MONOCYTES NFR BLD AUTO: 7.6 %
NEUTROPHILS # BLD AUTO: 2.4 X10(3)/MCL (ref 2.1–9.2)
NEUTROPHILS NFR BLD AUTO: 50.5 %
NRBC BLD AUTO-RTO: 0 %
PLATELET # BLD AUTO: 154 X10(3)/MCL (ref 130–400)
PMV BLD AUTO: 11.6 FL (ref 7.4–10.4)
POTASSIUM SERPL-SCNC: 3.3 MMOL/L (ref 3.5–5.1)
PROT SERPL-MCNC: 6.3 GM/DL (ref 6.4–8.3)
RBC # BLD AUTO: 4.14 X10(6)/MCL (ref 4.2–5.4)
SODIUM SERPL-SCNC: 143 MMOL/L (ref 136–145)
TRIGL SERPL-MCNC: 46 MG/DL (ref 37–140)
VLDLC SERPL CALC-MCNC: 9 MG/DL
WBC # SPEC AUTO: 4.75 X10(3)/MCL (ref 4.5–11.5)

## 2024-01-19 PROCEDURE — 36415 COLL VENOUS BLD VENIPUNCTURE: CPT

## 2024-01-19 PROCEDURE — 85025 COMPLETE CBC W/AUTO DIFF WBC: CPT

## 2024-01-19 PROCEDURE — 82043 UR ALBUMIN QUANTITATIVE: CPT

## 2024-01-19 PROCEDURE — 80061 LIPID PANEL: CPT

## 2024-01-19 PROCEDURE — 80053 COMPREHEN METABOLIC PANEL: CPT

## 2024-01-19 PROCEDURE — 83036 HEMOGLOBIN GLYCOSYLATED A1C: CPT

## 2024-02-19 ENCOUNTER — OFFICE VISIT (OUTPATIENT)
Dept: INTERNAL MEDICINE | Facility: CLINIC | Age: 59
End: 2024-02-19
Payer: OTHER GOVERNMENT

## 2024-02-19 VITALS
HEART RATE: 73 BPM | WEIGHT: 176.38 LBS | TEMPERATURE: 98 F | HEIGHT: 67 IN | RESPIRATION RATE: 18 BRPM | BODY MASS INDEX: 27.68 KG/M2 | SYSTOLIC BLOOD PRESSURE: 122 MMHG | DIASTOLIC BLOOD PRESSURE: 83 MMHG

## 2024-02-19 DIAGNOSIS — I10 PRIMARY HYPERTENSION: Chronic | ICD-10-CM

## 2024-02-19 DIAGNOSIS — E11.65 TYPE 2 DIABETES MELLITUS WITH HYPERGLYCEMIA, WITHOUT LONG-TERM CURRENT USE OF INSULIN: Primary | Chronic | ICD-10-CM

## 2024-02-19 DIAGNOSIS — E78.2 MIXED HYPERLIPIDEMIA: Chronic | ICD-10-CM

## 2024-02-19 DIAGNOSIS — Z12.12 ENCOUNTER FOR COLORECTAL CANCER SCREENING: ICD-10-CM

## 2024-02-19 DIAGNOSIS — Z12.11 ENCOUNTER FOR COLORECTAL CANCER SCREENING: ICD-10-CM

## 2024-02-19 DIAGNOSIS — L84 CALLUS OF FOOT: ICD-10-CM

## 2024-02-19 DIAGNOSIS — F17.210 CIGARETTE NICOTINE DEPENDENCE WITHOUT COMPLICATION: Chronic | ICD-10-CM

## 2024-02-19 DIAGNOSIS — K21.9 GASTROESOPHAGEAL REFLUX DISEASE, UNSPECIFIED WHETHER ESOPHAGITIS PRESENT: Chronic | ICD-10-CM

## 2024-02-19 PROCEDURE — 99214 OFFICE O/P EST MOD 30 MIN: CPT | Mod: 25,S$PBB,, | Performed by: NURSE PRACTITIONER

## 2024-02-19 PROCEDURE — 99215 OFFICE O/P EST HI 40 MIN: CPT | Mod: PBBFAC | Performed by: NURSE PRACTITIONER

## 2024-02-19 RX ORDER — FAMOTIDINE 40 MG/1
40 TABLET, FILM COATED ORAL DAILY
Qty: 90 TABLET | Refills: 2 | Status: SHIPPED | OUTPATIENT
Start: 2024-02-19

## 2024-02-19 NOTE — PROGRESS NOTES
Patient ID: [id number]     Chief Complaint: Follow-up, Gastroesophageal Reflux, and Results    HPI:     Maria Luisa Boyle is a 58 y.o. female with diagnosis of HTN, HLD, DM2, Tobacco Use. Patient seen in clinic today for follow up. Patient last seen in clinic on 2023.  CXR and EKG completed in ED on 2022 due to chest wall pain, both negative. Patient prescribed Baclofen and Toradol, pain resolved. Patient also stated SOB, related it to her smoking. PFTs scheduled for 2022, patient unable to complete. Patient prescribed Albuterol inhaler prn and Serevent but never picked up medication, patient states she doesn't need them. States SOB resolved.   Patient currently prescribed atenolol 25 mg daily, HCTZ 25 mg daily, losartan 50 mg daily for HTN.  Patient denies chest pain, shortness for breath.  Patient currently prescribed metformin 500 mg b.i.d.  DM2.  Patient denies hypoglycemia symptoms.  At previous appointment, patient's omeprazole increased to 40 mg daily for GERD.  Patient states no improvement.  Patient denies dysphagia, weight loss, choking, cough.  Patient denies any other acute complaints.      Patient followed by [facility]opedic [facility] for right knee osteoarthritis. Last appointment on 2022. Patient received Euflexxa injection  On 2022, 2022, and 2022. Patient states mild relief. Patient to follow up as needed.      Review of patient's allergies indicates:  No Known Allergies    Breast Cancer Screening: MMG negative on 2024  Cervical Cancer Screening: Hysterectomy  Colorectal Cancer Screening: FIT negative 2021; Cologuard ordered 2022, patient did not complete, GI referral ordered  Diabetic Eye Exam: 2022, followed by Ophthalmology Clinic  Diabetic Foot Exam: 2024  Lung Cancer Screenin2022, negative; ordered 2024  Prostate Cancer Screening: N/A  AAA Screening: N/A  Osteoporosis Screening: deferred due to age  Medicare Wellness:  N/A  Immunizations:   Immunization History   Administered Date(s) Administered    COVID-19, MRNA, LN-S, PF (Pfizer) (Purple Cap) 03/17/2021, 04/07/2021     Past Surgical History:   Procedure Laterality Date    HYSTERECTOMY       family history includes Cancer in her father; Cataracts in her maternal aunt and paternal grandmother; Diabetes in her mother; Hyperlipidemia in her father and mother; Hypertension in her father and mother.    Social History     Socioeconomic History    Marital status:    Tobacco Use    Smoking status: Every Day     Types: Cigarettes    Smokeless tobacco: Never    Tobacco comments:     2  PER DAY   Substance and Sexual Activity    Alcohol use: Not Currently     Comment: ocassionally wine/ liquor    Drug use: Never    Sexual activity: Yes     Partners: Male     Social Determinants of Health     Financial Resource Strain: High Risk (2/19/2024)    Overall Financial Resource Strain (CARDIA)     Difficulty of Paying Living Expenses: Hard   Food Insecurity: No Food Insecurity (2/19/2024)    Hunger Vital Sign     Worried About Running Out of Food in the Last Year: Never true     Ran Out of Food in the Last Year: Never true   Transportation Needs: No Transportation Needs (2/19/2024)    PRAPARE - Transportation     Lack of Transportation (Medical): No     Lack of Transportation (Non-Medical): No   Physical Activity: Insufficiently Active (2/19/2024)    Exercise Vital Sign     Days of Exercise per Week: 7 days     Minutes of Exercise per Session: 20 min   Stress: No Stress Concern Present (2/19/2024)    [employer] [place] [employer] - Occupational Stress Questionnaire     Feeling of Stress : Only a little   Social Connections: Moderately Isolated (2/19/2024)    Social Connection and Isolation Panel [NHANES]     Frequency of Communication with Friends and Family: More than three times a week     Frequency of Social Gatherings with Friends and Family: Once a week     Attends [employer]  "Services: More than 4 times per year     Active Member of Clubs or Organizations: No     Attends Club or Organization Meetings: Never     Marital Status:    Housing Stability: Low Risk  (2/19/2024)    Housing Stability Vital Sign     Unable to Pay for Housing in the Last Year: No     Number of Places Lived in the Last Year: 1     Unstable Housing in the Last Year: No     Current Outpatient Medications   Medication Instructions    atenoloL (TENORMIN) 25 MG tablet TAKE 1 TABLET BY MOUTH DAILY    famotidine (PEPCID) 40 mg, Oral, Daily    hydroCHLOROthiazide (HYDRODIURIL) 25 mg, Oral, Daily    ibuprofen (ADVIL,MOTRIN) 800 mg, Oral, 3 times daily    losartan (COZAAR) 50 mg, Oral, Daily    metFORMIN (GLUCOPHAGE) 500 mg, Oral, 2 times daily    omeprazole (PRILOSEC) 40 mg, Oral, Every morning    potassium chloride SA (K-DUR,KLOR-CON) 20 MEQ tablet 20 mEq, Oral, Daily    simvastatin (ZOCOR) 20 mg, Oral, Nightly       Subjective:     Review of Systems   Constitutional: Negative.    HENT: Negative.     Eyes: Negative.    Respiratory: Negative.     Cardiovascular: Negative.    Gastrointestinal: Negative.         Reflux   Endocrine: Negative.    Genitourinary: Negative.    Musculoskeletal: Negative.    Skin: Negative.    Allergic/Immunologic: Negative.    Neurological: Negative.    Hematological: Negative.    Psychiatric/Behavioral: Negative.         Objective:     Visit Vitals  /83 (BP Location: Left arm, Patient Position: Sitting, BP Method: Large (Automatic))   Pulse 73   Temp 98.1 °F (36.7 °C) (Oral)   Resp 18   Ht 5' 7.01" (1.702 m)   Wt 80 kg (176 lb 6.4 oz)   BMI 27.62 kg/m²       Physical Exam  Vitals reviewed.   Constitutional:       Appearance: Normal appearance.   HENT:      Head: Normocephalic and atraumatic.      Mouth/Throat:      Mouth: Mucous membranes are moist.      Pharynx: Oropharynx is clear.   Eyes:      Extraocular Movements: Extraocular movements intact.      Conjunctiva/sclera: Conjunctivae " normal.      Pupils: Pupils are equal, round, and reactive to light.   Cardiovascular:      Rate and Rhythm: Normal rate and regular rhythm.      Heart sounds: Normal heart sounds.   Pulmonary:      Effort: Pulmonary effort is normal.      Breath sounds: Normal breath sounds.   Abdominal:      General: Bowel sounds are normal.   Musculoskeletal:         General: Normal range of motion.      Cervical back: Normal range of motion.   Skin:     General: Skin is warm and dry.   Neurological:      Mental Status: She is alert and oriented to person, place, and time.   Psychiatric:         Mood and Affect: Mood normal.         Behavior: Behavior normal.     Protective Sensation (w/ 10 gram monofilament):  Right: Intact  Left: Intact    Visual Inspection:  Callus -  Bilateral    Pedal Pulses:   Right: Present  Left: Present    Posterior Tibialis Pulses:   Right:Present  Left: Present      Labs Reviewed:     Hematology:  Lab Results   Component Value Date    WBC 4.75 01/19/2024    HGB 12.3 01/19/2024    HCT 38.4 01/19/2024     01/19/2024     Chemistry:  Lab Results   Component Value Date     01/19/2024    K 3.3 (L) 01/19/2024    CHLORIDE 103 01/19/2024    BUN 13.2 01/19/2024    CREATININE 0.75 01/19/2024    EGFRNORACEVR >60 01/19/2024    GLUCOSE 156 (H) 01/19/2024    CALCIUM 10.0 01/19/2024    ALKPHOS 56 01/19/2024    LABPROT 6.3 (L) 01/19/2024    ALBUMIN 3.9 01/19/2024    BILIDIR 0.2 10/21/2021    IBILI 0.20 10/21/2021    AST 17 01/19/2024    ALT 17 01/19/2024    MG 1.00 (L) 10/16/2022     Lab Results   Component Value Date    HGBA1C 6.7 01/19/2024     Lipid Panel:  Lab Results   Component Value Date    CHOL 151 01/19/2024    HDL 50 01/19/2024    LDL 92.00 01/19/2024    TRIG 46 01/19/2024    TOTALCHOLEST 3 01/19/2024     Thyroid:  Lab Results   Component Value Date    TSH 1.7202 11/16/2022     Urine:  Lab Results   Component Value Date    COLORUA Yellow 07/13/2023    APPEARANCEUA Clear 07/13/2023    SGUA 1.031  07/13/2023    PHUA 6.0 07/13/2023    PROTEINUA Trace (A) 07/13/2023    GLUCOSEUA Normal 07/13/2023    KETONESUA Negative 07/13/2023    BLOODUA Negative 07/13/2023    NITRITESUA Negative 07/13/2023    LEUKOCYTESUR Negative 07/13/2023    RBCUA 0-5 07/13/2023    WBCUA 0-5 07/13/2023    BACTERIA None Seen 07/13/2023    SQEPUA Few (A) 07/13/2023    HYALINECASTS None Seen 07/13/2023    CREATRANDUR 122.5 (H) 01/19/2024        Assessment:       ICD-10-CM ICD-9-CM   1. Type 2 diabetes mellitus with hyperglycemia, without long-term current use of insulin  E11.65 250.00     790.29   2. Mixed hyperlipidemia  E78.2 272.2   3. Primary hypertension  I10 401.9   4. Gastroesophageal reflux disease, unspecified whether esophagitis present  K21.9 530.81   5. Callus of foot  L84 700   6. Cigarette nicotine dependence without complication  F17.210 305.1   7. Encounter for colorectal cancer screening  Z12.11 V76.51    Z12.12 V76.41       Plan:     1. Type 2 diabetes mellitus with hyperglycemia, without long-term current use of insulin  Continue Metformin 500 mg bid  Encouraged home CBG monitoring.  Hypoglycemic episodes: denies  Body mass index is 27.62 kg/m².  Hemoglobin A1c   Date Value Ref Range Status   01/19/2024 6.7 <=7.0 % Final     Results for orders placed or performed in visit on 01/19/24   Microalbumin/Creatinine Ratio, Urine   Result Value Ref Range    Urine Microalbumin 11.7 <=30.0 ug/ml    Urine Creatinine 122.5 (H) 45.0 - 106.0 mg/dL    Microalbumin Creatinine Ratio 9.6 0.0 - 30.0 mg/gm Cr   On Simvastatin  On Losartan  Weight Loss Encouraged  ADA Diet  - Ambulatory referral/consult to Podiatry; Future    2. Mixed hyperlipidemia  Continue Simvastatin 20 mg Qhs  Weight loss encouraged  Low fat/high fiber diet  Increase physical activity  Tobacco cessation encouraged  Cholesterol Total   Date Value Ref Range Status   01/19/2024 151 <=200 mg/dL Final     HDL Cholesterol   Date Value Ref Range Status   01/19/2024 50 35 - 60  mg/dL Final     Triglyceride   Date Value Ref Range Status   01/19/2024 46 37 - 140 mg/dL Final     LDL Cholesterol   Date Value Ref Range Status   01/19/2024 92.00 50.00 - 140.00 mg/dL Final     3. Primary hypertension  Vitals:    02/19/24 0817   BP: 122/83   Pulse: 73   Resp: 18   Temp: 98.1 °F (36.7 °C)      Results for orders placed or performed in visit on 01/19/24   Microalbumin/Creatinine Ratio, Urine   Result Value Ref Range    Urine Microalbumin 11.7 <=30.0 ug/ml    Urine Creatinine 122.5 (H) 45.0 - 106.0 mg/dL    Microalbumin Creatinine Ratio 9.6 0.0 - 30.0 mg/gm Cr     Results for orders placed or performed during the hospital encounter of 11/21/22   EKG 12-lead    Collection Time: 11/21/22  8:07 AM    Narrative    Test Reason : R07.9,    Vent. Rate : 061 BPM     Atrial Rate : 061 BPM     P-R Int : 120 ms          QRS Dur : 080 ms      QT Int : 426 ms       P-R-T Axes : 067 075 056 degrees     QTc Int : 428 ms    Normal sinus rhythm with sinus arrhythmia  Normal ECG  When compared with ECG of 16-OCT-2022 06:24,  No significant change was found  Confirmed by Vignesh Ignacio MD ([id number]) on 11/21/2022 10:09:22 AM    Referred By: YARELYERR   SELF           Confirmed By:Vignesh Ignacio MD   Continue Losartan 50 mg daily, HCTZ 25 mg daily, Atenolol 25 mg daily  DASH diet  Encouraged home blood pressure monitoring    4. Gastroesophageal reflux disease, unspecified whether esophagitis present  Continue Prilosec 40 mg Qam  Start Pepcid 40 mg Qpm  Avoid spicy foods  Remain in an upright position for at least 30 minutes after consuming meals  GI referral ordered  - Ambulatory referral/consult to Gastroenterology; Future    5. Callus of foot  - Ambulatory referral/consult to Podiatry; Future    6. Cigarette nicotine dependence without complication  Smoking cessation education provided, encouraged. Informed of smoking cessation program. Patient refused smoking cessation at this time. I spent 3 minutes discussing smoking  cessation with patient.   - CT Chest Lung Screening Low Dose; Future    7. Encounter for colorectal cancer screening  - Ambulatory referral/consult to Gastroenterology; Future      Follow up in about 6 months (around 8/19/2024) for Labs. In addition to their scheduled follow up, the patient has also been instructed to follow up on as needed basis.     Cyndi Chilel, HERVEP

## 2024-05-01 DIAGNOSIS — K21.9 GASTROESOPHAGEAL REFLUX DISEASE, UNSPECIFIED WHETHER ESOPHAGITIS PRESENT: Primary | ICD-10-CM

## 2024-05-02 RX ORDER — OMEPRAZOLE 40 MG/1
40 CAPSULE, DELAYED RELEASE ORAL EVERY MORNING
Qty: 90 CAPSULE | Refills: 2 | Status: SHIPPED | OUTPATIENT
Start: 2024-05-02

## 2024-05-30 RX ORDER — ATENOLOL 25 MG/1
TABLET ORAL
Qty: 90 TABLET | Refills: 2 | Status: SHIPPED | OUTPATIENT
Start: 2024-05-30

## 2024-07-30 NOTE — TELEPHONE ENCOUNTER
----- Message from Uday Garcia sent at 7/30/2024  9:20 AM CDT -----  .Type:  Needs Medical Advice    Who Called: Maria Luisa  Symptoms (please be specific):    How long has patient had these symptoms:    Pharmacy name and phone #:    Would the patient rather a call back or a response via [facility]?   Best Call Back Number: [phone]  Additional Information: Patient requested a call back from the nurse she has questions about her medications.

## 2024-07-31 RX ORDER — SIMVASTATIN 20 MG/1
20 TABLET, FILM COATED ORAL NIGHTLY
Qty: 90 TABLET | Refills: 3 | Status: SHIPPED | OUTPATIENT
Start: 2024-07-31 | End: 2025-07-31

## 2024-07-31 RX ORDER — IBUPROFEN 800 MG/1
800 TABLET ORAL 3 TIMES DAILY
Qty: 270 TABLET | Refills: 2 | Status: SHIPPED | OUTPATIENT
Start: 2024-07-31

## 2024-07-31 RX ORDER — POTASSIUM CHLORIDE 20 MEQ/1
20 TABLET, EXTENDED RELEASE ORAL DAILY
Qty: 90 TABLET | Refills: 2 | Status: SHIPPED | OUTPATIENT
Start: 2024-07-31

## 2024-08-09 ENCOUNTER — CLINICAL SUPPORT (OUTPATIENT)
Dept: OPHTHALMOLOGY | Facility: CLINIC | Age: 59
End: 2024-08-09
Payer: OTHER GOVERNMENT

## 2024-08-09 DIAGNOSIS — E11.65 TYPE 2 DIABETES MELLITUS WITH HYPERGLYCEMIA, WITHOUT LONG-TERM CURRENT USE OF INSULIN: Primary | Chronic | ICD-10-CM

## 2024-08-09 PROCEDURE — 99211 OFF/OP EST MAY X REQ PHY/QHP: CPT | Mod: PBBFAC,PN

## 2024-08-09 PROCEDURE — 92250 FUNDUS PHOTOGRAPHY W/I&R: CPT | Mod: PBBFAC,PN

## 2024-08-09 PROCEDURE — 92250 FUNDUS PHOTOGRAPHY W/I&R: CPT | Mod: PBBFAC,PN | Performed by: OPHTHALMOLOGY

## 2024-08-12 ENCOUNTER — LAB VISIT (OUTPATIENT)
Dept: LAB | Facility: HOSPITAL | Age: 59
End: 2024-08-12
Attending: NURSE PRACTITIONER
Payer: OTHER GOVERNMENT

## 2024-08-12 DIAGNOSIS — E78.2 MIXED HYPERLIPIDEMIA: Chronic | ICD-10-CM

## 2024-08-12 DIAGNOSIS — E11.65 TYPE 2 DIABETES MELLITUS WITH HYPERGLYCEMIA, WITHOUT LONG-TERM CURRENT USE OF INSULIN: ICD-10-CM

## 2024-08-12 DIAGNOSIS — I10 PRIMARY HYPERTENSION: Chronic | ICD-10-CM

## 2024-08-12 LAB
ALBUMIN SERPL-MCNC: 3.7 G/DL (ref 3.5–5)
ALBUMIN/GLOB SERPL: 1.4 RATIO (ref 1.1–2)
ALP SERPL-CCNC: 78 UNIT/L (ref 40–150)
ALT SERPL-CCNC: 23 UNIT/L (ref 0–55)
ANION GAP SERPL CALC-SCNC: 10 MEQ/L
AST SERPL-CCNC: 18 UNIT/L (ref 5–34)
BACTERIA #/AREA URNS AUTO: ABNORMAL /HPF
BASOPHILS # BLD AUTO: 0.01 X10(3)/MCL
BASOPHILS NFR BLD AUTO: 0.2 %
BILIRUB SERPL-MCNC: 0.4 MG/DL
BILIRUB UR QL STRIP.AUTO: NEGATIVE
BUN SERPL-MCNC: 16.6 MG/DL (ref 9.8–20.1)
CALCIUM SERPL-MCNC: 9.7 MG/DL (ref 8.4–10.2)
CHLORIDE SERPL-SCNC: 103 MMOL/L (ref 98–107)
CHOLEST SERPL-MCNC: 145 MG/DL
CHOLEST/HDLC SERPL: 3 {RATIO} (ref 0–5)
CLARITY UR: CLEAR
CO2 SERPL-SCNC: 29 MMOL/L (ref 22–29)
COLOR UR AUTO: YELLOW
CREAT SERPL-MCNC: 0.81 MG/DL (ref 0.55–1.02)
CREAT UR-MCNC: 218.3 MG/DL (ref 45–106)
CREAT/UREA NIT SERPL: 20
EOSINOPHIL # BLD AUTO: 0.16 X10(3)/MCL (ref 0–0.9)
EOSINOPHIL NFR BLD AUTO: 2.7 %
ERYTHROCYTE [DISTWIDTH] IN BLOOD BY AUTOMATED COUNT: 13 % (ref 11.5–17)
EST. AVERAGE GLUCOSE BLD GHB EST-MCNC: 194.4 MG/DL
GFR SERPLBLD CREATININE-BSD FMLA CKD-EPI: >60 ML/MIN/1.73/M2
GLOBULIN SER-MCNC: 2.7 GM/DL (ref 2.4–3.5)
GLUCOSE SERPL-MCNC: 222 MG/DL (ref 74–100)
GLUCOSE UR QL STRIP: NORMAL
HBA1C MFR BLD: 8.4 %
HCT VFR BLD AUTO: 38.3 % (ref 37–47)
HDLC SERPL-MCNC: 51 MG/DL (ref 35–60)
HGB BLD-MCNC: 12.3 G/DL (ref 12–16)
HGB UR QL STRIP: NEGATIVE
HYALINE CASTS #/AREA URNS LPF: ABNORMAL /LPF
IMM GRANULOCYTES # BLD AUTO: 0.02 X10(3)/MCL (ref 0–0.04)
IMM GRANULOCYTES NFR BLD AUTO: 0.3 %
KETONES UR QL STRIP: NEGATIVE
LDLC SERPL CALC-MCNC: 79 MG/DL (ref 50–140)
LEUKOCYTE ESTERASE UR QL STRIP: NEGATIVE
LYMPHOCYTES # BLD AUTO: 2.59 X10(3)/MCL (ref 0.6–4.6)
LYMPHOCYTES NFR BLD AUTO: 43.5 %
MCH RBC QN AUTO: 29.4 PG (ref 27–31)
MCHC RBC AUTO-ENTMCNC: 32.1 G/DL (ref 33–36)
MCV RBC AUTO: 91.6 FL (ref 80–94)
MICROALBUMIN UR-MCNC: 28.4 UG/ML
MICROALBUMIN/CREAT RATIO PNL UR: 13 MG/GM CR (ref 0–30)
MONOCYTES # BLD AUTO: 0.48 X10(3)/MCL (ref 0.1–1.3)
MONOCYTES NFR BLD AUTO: 8.1 %
MUCOUS THREADS URNS QL MICRO: ABNORMAL /LPF
NEUTROPHILS # BLD AUTO: 2.69 X10(3)/MCL (ref 2.1–9.2)
NEUTROPHILS NFR BLD AUTO: 45.2 %
NITRITE UR QL STRIP: NEGATIVE
NRBC BLD AUTO-RTO: 0 %
PH UR STRIP: 6 [PH]
PLATELET # BLD AUTO: 179 X10(3)/MCL (ref 130–400)
PMV BLD AUTO: 11.9 FL (ref 7.4–10.4)
POTASSIUM SERPL-SCNC: 3.3 MMOL/L (ref 3.5–5.1)
PROT SERPL-MCNC: 6.4 GM/DL (ref 6.4–8.3)
PROT UR QL STRIP: ABNORMAL
RBC # BLD AUTO: 4.18 X10(6)/MCL (ref 4.2–5.4)
RBC #/AREA URNS AUTO: ABNORMAL /HPF
SODIUM SERPL-SCNC: 142 MMOL/L (ref 136–145)
SP GR UR STRIP.AUTO: 1.04 (ref 1–1.03)
SQUAMOUS #/AREA URNS LPF: ABNORMAL /HPF
TRIGL SERPL-MCNC: 76 MG/DL (ref 37–140)
TSH SERPL-ACNC: 3.06 UIU/ML (ref 0.35–4.94)
UROBILINOGEN UR STRIP-ACNC: ABNORMAL
VLDLC SERPL CALC-MCNC: 15 MG/DL
WBC # BLD AUTO: 5.95 X10(3)/MCL (ref 4.5–11.5)
WBC #/AREA URNS AUTO: ABNORMAL /HPF

## 2024-08-12 PROCEDURE — 83036 HEMOGLOBIN GLYCOSYLATED A1C: CPT

## 2024-08-12 PROCEDURE — 81001 URINALYSIS AUTO W/SCOPE: CPT

## 2024-08-12 PROCEDURE — 85025 COMPLETE CBC W/AUTO DIFF WBC: CPT

## 2024-08-12 PROCEDURE — 80053 COMPREHEN METABOLIC PANEL: CPT

## 2024-08-12 PROCEDURE — 82570 ASSAY OF URINE CREATININE: CPT

## 2024-08-12 PROCEDURE — 84443 ASSAY THYROID STIM HORMONE: CPT

## 2024-08-12 PROCEDURE — 82043 UR ALBUMIN QUANTITATIVE: CPT

## 2024-08-12 PROCEDURE — 80061 LIPID PANEL: CPT

## 2024-08-12 PROCEDURE — 36415 COLL VENOUS BLD VENIPUNCTURE: CPT

## 2024-08-12 NOTE — PROGRESS NOTES
Fundus Photo complete HPI    Fundus Photo DM   Last edited by Tyra Sharif MA on 8/9/2024  7:59 AM.            Assessment /Plan     For exam results, see Encounter Report.    Type 2 diabetes mellitus with hyperglycemia, without long-term current use of insulin  -     Color Fundus Photography - OU - Both Eyes; Future      08/09/2024 Fundus Photo Complete

## 2024-08-14 NOTE — ADDENDUM NOTE
Addended by: [employer]ERR[employer] on: 8/14/2024 09:39 AM     Modules accepted: Orders, Level of Service

## 2024-08-14 NOTE — PROGRESS NOTES
HPI    Fundus Photo DM   Last edited by Tyra Sharif MA on 8/9/2024  7:59 AM.            Assessment /Plan     Benign Hydrocystoma of left lower eyelid  - s/p removal 9/27/23  - Doing well today  - pathology showed benign hydrocystoma   - Wants to postpone DFE until she can have  with her    2. T2DM  - annual DFE and Fundus photos  8/9/2024:The patient presented for fundus photos only.The photos show no evidence of diabetic retinopathy.  Will have the patient return for an annual DFE.    RTC 9/13/24  DFE, OCT mac

## 2024-08-15 RX ORDER — HYDROCHLOROTHIAZIDE 25 MG/1
25 TABLET ORAL DAILY
Qty: 90 TABLET | Refills: 2 | Status: SHIPPED | OUTPATIENT
Start: 2024-08-15

## 2024-08-15 RX ORDER — LOSARTAN POTASSIUM 50 MG/1
50 TABLET ORAL DAILY
Qty: 90 TABLET | Refills: 2 | Status: SHIPPED | OUTPATIENT
Start: 2024-08-15

## 2024-08-15 RX ORDER — METFORMIN HYDROCHLORIDE 500 MG/1
500 TABLET ORAL 2 TIMES DAILY
Qty: 180 TABLET | Refills: 2 | Status: SHIPPED | OUTPATIENT
Start: 2024-08-15

## 2024-08-19 ENCOUNTER — OFFICE VISIT (OUTPATIENT)
Dept: INTERNAL MEDICINE | Facility: CLINIC | Age: 59
End: 2024-08-19
Payer: OTHER GOVERNMENT

## 2024-08-19 VITALS
SYSTOLIC BLOOD PRESSURE: 133 MMHG | HEIGHT: 67 IN | DIASTOLIC BLOOD PRESSURE: 81 MMHG | BODY MASS INDEX: 28.83 KG/M2 | WEIGHT: 183.69 LBS | HEART RATE: 73 BPM | TEMPERATURE: 98 F | RESPIRATION RATE: 18 BRPM

## 2024-08-19 DIAGNOSIS — F17.210 CIGARETTE NICOTINE DEPENDENCE WITHOUT COMPLICATION: Chronic | ICD-10-CM

## 2024-08-19 DIAGNOSIS — I10 PRIMARY HYPERTENSION: Chronic | ICD-10-CM

## 2024-08-19 DIAGNOSIS — Z12.12 ENCOUNTER FOR COLORECTAL CANCER SCREENING: ICD-10-CM

## 2024-08-19 DIAGNOSIS — K21.9 GASTROESOPHAGEAL REFLUX DISEASE, UNSPECIFIED WHETHER ESOPHAGITIS PRESENT: Chronic | ICD-10-CM

## 2024-08-19 DIAGNOSIS — Z12.11 ENCOUNTER FOR COLORECTAL CANCER SCREENING: ICD-10-CM

## 2024-08-19 DIAGNOSIS — E11.65 TYPE 2 DIABETES MELLITUS WITH HYPERGLYCEMIA, WITHOUT LONG-TERM CURRENT USE OF INSULIN: Primary | Chronic | ICD-10-CM

## 2024-08-19 DIAGNOSIS — E78.2 MIXED HYPERLIPIDEMIA: Chronic | ICD-10-CM

## 2024-08-19 DIAGNOSIS — M79.675 GREAT TOE PAIN, LEFT: ICD-10-CM

## 2024-08-19 PROCEDURE — 99406 BEHAV CHNG SMOKING 3-10 MIN: CPT | Mod: S$PBB,,, | Performed by: NURSE PRACTITIONER

## 2024-08-19 PROCEDURE — 99214 OFFICE O/P EST MOD 30 MIN: CPT | Mod: PBBFAC | Performed by: NURSE PRACTITIONER

## 2024-08-19 PROCEDURE — 99214 OFFICE O/P EST MOD 30 MIN: CPT | Mod: 25,S$PBB,, | Performed by: NURSE PRACTITIONER

## 2024-08-19 RX ORDER — METFORMIN HYDROCHLORIDE 500 MG/1
1000 TABLET ORAL 2 TIMES DAILY
Qty: 360 TABLET | Refills: 2 | Status: SHIPPED | OUTPATIENT
Start: 2024-08-19

## 2024-08-19 NOTE — PROGRESS NOTES
Patient ID: [id number]     Chief Complaint: Follow-up, Diabetes, and Results    HPI:     Maria Luisa Boyle is a 58 y.o. female with diagnosis of HTN, HLD, DM2, Tobacco Use. Patient seen in clinic today for follow up. Patient last seen in clinic on 02/19/2024.  Hx: CXR and EKG completed in ED on 11/21/2022 due to chest wall pain, both negative. Patient prescribed Baclofen and Toradol, pain resolved. Patient also stated SOB, related it to her smoking. PFTs scheduled for 12/27/2022, patient unable to complete. Patient prescribed Albuterol inhaler prn and Serevent but never picked up medication, patient states she doesn't need them. States SOB resolved.   Today, patient here for follow up. Patient states taking medications as prescribed, tolerating well.   At previous appt, patient referred to GI for GERD/Colonoscopy, patient canceled appointment scheduled for 06/27/2024 and 07/10/2024. Patient currently prescribed Omeprazole 40 mg daily and Pepcid 40 mg Qpm. Patient states medication helping with GERD. Patient denies dysphagia, weight loss, choking, cough.  Patient currently prescribed Atenolol 25 mg daily, HCTZ 25 mg daily, losartan 50 mg daily for HTN.  Patient denies chest pain, shortness for breath. Patient states she checks her B/P at home, denies HTN.   Patient currently prescribed Metformin 500 mg b.i.d.  DM2. Patient denies hypoglycemia symptoms. Previous A1c 6.7. Patient states she checks her CBGs at  home, have been elevated. Patient states her dad was diagnosed with cancer and recently passed away, states she has been eating more and thinks it is related to that.    Patient also states she had left great toe pain, redness, swelling back in July 2024. Patient states she thinks it was gout but did not seek medical attention. Patient states pain, redness, swelling resolved a few weeks later. Patient denies pain, redness, swelling today.     Patient followed by Ophthalmology Clinic. Last appointment on 08/09/2024.  Patient has follow up appointment scheduled for 2024.      Patient followed by Orthopedic Clinic for right knee osteoarthritis. Last appointment on 2022. Patient received Euflexxa injection  On 2022, 2022, and 2022. Patient states mild relief. Patient to follow up as needed.      Review of patient's allergies indicates:  No Known Allergies    Breast Cancer Screening: MMG negative on 2024  Cervical Cancer Screening: Hysterectomy  Colorectal Cancer Screening: FIT negative 2021; Cologuard ordered 2022, patient did not complete, GI referral ordered, patient canceled appointment. FIT ordered per patient request 2024  Diabetic Eye Exam: 2024, followed by Ophthalmology Clinic  Diabetic Foot Exam: 2024  Lung Cancer Screenin2022, negative; denied by insurance  Prostate Cancer Screening: N/A  AAA Screening: N/A  Osteoporosis Screening: deferred due to age  Medicare Wellness: N/A  Immunizations:   Immunization History   Administered Date(s) Administered    COVID-19, MRNA, LN-S, PF (Pfizer) (Purple Cap) 2021, 2021     Past Surgical History:   Procedure Laterality Date    HYSTERECTOMY       family history includes Cancer in her father; Cataracts in her maternal aunt and paternal grandmother; Diabetes in her mother; Hyperlipidemia in her father and mother; Hypertension in her father and mother.    Social History     Socioeconomic History    Marital status:    Tobacco Use    Smoking status: Every Day     Types: Cigarettes    Smokeless tobacco: Never    Tobacco comments:     2  PER DAY   Substance and Sexual Activity    Alcohol use: Not Currently     Comment: ocassionally wine/ liquor    Drug use: Never    Sexual activity: Yes     Partners: Male     Social Determinants of Health     Financial Resource Strain: High Risk (2024)    Overall Financial Resource Strain (CARDIA)     Difficulty of Paying Living Expenses: Hard   Food  Insecurity: No Food Insecurity (2/19/2024)    Hunger Vital Sign     Worried About Running Out of Food in the Last Year: Never true     Ran Out of Food in the Last Year: Never true   Transportation Needs: No Transportation Needs (2/19/2024)    PRAPARE - Transportation     Lack of Transportation (Medical): No     Lack of Transportation (Non-Medical): No   Physical Activity: Insufficiently Active (2/19/2024)    Exercise Vital Sign     Days of Exercise per Week: 7 days     Minutes of Exercise per Session: 20 min   Stress: No Stress Concern Present (2/19/2024)    [employer] - Occupational Stress Questionnaire     Feeling of Stress : Only a little   Housing Stability: Low Risk  (2/19/2024)    Housing Stability Vital Sign     Unable to Pay for Housing in the Last Year: No     Number of Places Lived in the Last Year: 1     Unstable Housing in the Last Year: No     Current Outpatient Medications   Medication Instructions    atenoloL (TENORMIN) 25 MG tablet TAKE 1 TABLET BY MOUTH DAILY    famotidine (PEPCID) 40 mg, Oral, Daily    hydroCHLOROthiazide (HYDRODIURIL) 25 mg, Oral, Daily    ibuprofen (ADVIL,MOTRIN) 800 mg, Oral, 3 times daily    losartan (COZAAR) 50 mg, Oral, Daily    metFORMIN (GLUCOPHAGE) 1,000 mg, Oral, 2 times daily    omeprazole (PRILOSEC) 40 mg, Oral, Every morning    potassium chloride SA (K-DUR,KLOR-CON) 20 MEQ tablet 20 mEq, Oral, Daily    simvastatin (ZOCOR) 20 mg, Oral, Nightly       Subjective:     Review of Systems   Constitutional: Negative.    HENT: Negative.     Eyes: Negative.    Respiratory: Negative.     Cardiovascular: Negative.    Gastrointestinal: Negative.    Endocrine: Negative.    Genitourinary: Negative.    Musculoskeletal: Negative.    Skin: Negative.    Allergic/Immunologic: Negative.    Neurological: Negative.    Hematological: Negative.    Psychiatric/Behavioral: Negative.         Objective:     Visit Vitals  /81 (BP Location: Left arm, Patient  "Position: Sitting, BP Method: Large (Automatic))   Pulse 73   Temp 98.3 °F (36.8 °C) (Oral)   Resp 18   Ht 5' 7.01" (1.702 m)   Wt 83.3 kg (183 lb 11.2 oz)   BMI 28.76 kg/m²       Physical Exam  Vitals reviewed.   Constitutional:       Appearance: Normal appearance.   HENT:      Head: Normocephalic and atraumatic.      Mouth/Throat:      Mouth: Mucous membranes are moist.      Pharynx: Oropharynx is clear.   Eyes:      Extraocular Movements: Extraocular movements intact.      Conjunctiva/sclera: Conjunctivae normal.      Pupils: Pupils are equal, round, and reactive to light.   Cardiovascular:      Rate and Rhythm: Normal rate and regular rhythm.      Heart sounds: Normal heart sounds.   Pulmonary:      Effort: Pulmonary effort is normal.      Breath sounds: Normal breath sounds.   Abdominal:      General: Bowel sounds are normal.   Musculoskeletal:         General: Normal range of motion.      Cervical back: Normal range of motion.   Skin:     General: Skin is warm and dry.   Neurological:      Mental Status: She is alert and oriented to person, place, and time.   Psychiatric:         Mood and Affect: Mood normal.         Behavior: Behavior normal.       Labs Reviewed:     Hematology:  Lab Results   Component Value Date    WBC 5.95 08/12/2024    HGB 12.3 08/12/2024    HCT 38.3 08/12/2024     08/12/2024     Chemistry:  Lab Results   Component Value Date     08/12/2024    K 3.3 (L) 08/12/2024    BUN 16.6 08/12/2024    CREATININE 0.81 08/12/2024    EGFRNORACEVR >60 08/12/2024    GLUCOSE 222 (H) 08/12/2024    CALCIUM 9.7 08/12/2024    ALKPHOS 78 08/12/2024    LABPROT 6.4 08/12/2024    ALBUMIN 3.7 08/12/2024    BILIDIR 0.2 10/21/2021    IBILI 0.20 10/21/2021    AST 18 08/12/2024    ALT 23 08/12/2024    MG 1.00 (L) 10/16/2022     Lab Results   Component Value Date    HGBA1C 8.4 (H) 08/12/2024     Lipid Panel:  Lab Results   Component Value Date    CHOL 145 08/12/2024    HDL 51 08/12/2024    LDL 79.00 " 08/12/2024    TRIG 76 08/12/2024    TOTALCHOLEST 3 08/12/2024     Thyroid:  Lab Results   Component Value Date    TSH 3.058 08/12/2024     Urine:  Lab Results   Component Value Date    APPEARANCEUA Clear 08/12/2024    SGUA 1.037 (H) 08/12/2024    PROTEINUA 1+ (A) 08/12/2024    KETONESUA Negative 08/12/2024    LEUKOCYTESUR Negative 08/12/2024    RBCUA 0-5 08/12/2024    WBCUA 0-5 08/12/2024    BACTERIA None Seen 08/12/2024    SQEPUA Many (A) 08/12/2024    HYALINECASTS None Seen 08/12/2024    CREATRANDUR 218.3 (H) 08/12/2024        Assessment:       ICD-10-CM ICD-9-CM   1. Type 2 diabetes mellitus with hyperglycemia, without long-term current use of insulin  E11.65 250.00     790.29   2. Mixed hyperlipidemia  E78.2 272.2   3. Primary hypertension  I10 401.9   4. Gastroesophageal reflux disease, unspecified whether esophagitis present  K21.9 530.81   5. Great toe pain, left  M79.675 729.5   6. Cigarette nicotine dependence without complication  F17.210 305.1   7. Encounter for colorectal cancer screening  Z12.11 V76.51    Z12.12 V76.41       Plan:     1. Type 2 diabetes mellitus with hyperglycemia, without long-term current use of insulin  Increase Metformin to 1,000 mg bid  Encouraged home CBG monitoring.  Hypoglycemic episodes: denies  Body mass index is 28.76 kg/m².  Hemoglobin A1c   Date Value Ref Range Status   08/12/2024 8.4 (H) <=7.0 % Final     Results for orders placed or performed in visit on 01/19/24   Microalbumin/Creatinine Ratio, Urine   Result Value Ref Range    Urine Microalbumin 11.7 <=30.0 ug/ml    Urine Creatinine 122.5 (H) 45.0 - 106.0 mg/dL    Microalbumin Creatinine Ratio 9.6 0.0 - 30.0 mg/gm Cr   On Simvastatin  On Losartan  Weight Loss Encouraged  ADA Diet  - Ambulatory referral/consult to Podiatry; Future    2. Mixed hyperlipidemia  Continue Simvastatin 20 mg Qhs  Weight loss encouraged  Low fat/high fiber diet  Increase physical activity  Tobacco cessation encouraged  Cholesterol Total   Date  Value Ref Range Status   08/12/2024 145 <=200 mg/dL Final     HDL Cholesterol   Date Value Ref Range Status   08/12/2024 51 35 - 60 mg/dL Final     Triglyceride   Date Value Ref Range Status   08/12/2024 76 37 - 140 mg/dL Final     LDL Cholesterol   Date Value Ref Range Status   08/12/2024 79.00 50.00 - 140.00 mg/dL Final     3. Primary hypertension  Vitals:    08/19/24 0709   BP: 133/81   Pulse: 73   Resp: 18   Temp: 98.3 °F (36.8 °C)     Results for orders placed or performed in visit on 01/19/24   Microalbumin/Creatinine Ratio, Urine   Result Value Ref Range    Urine Microalbumin 11.7 <=30.0 ug/ml    Urine Creatinine 122.5 (H) 45.0 - 106.0 mg/dL    Microalbumin Creatinine Ratio 9.6 0.0 - 30.0 mg/gm Cr     Results for orders placed or performed during the hospital encounter of 11/21/22   EKG 12-lead    Collection Time: 11/21/22  8:07 AM    Narrative    Test Reason : R07.9,    Vent. Rate : 061 BPM     Atrial Rate : 061 BPM     P-R Int : 120 ms          QRS Dur : 080 ms      QT Int : 426 ms       P-R-T Axes : 067 075 056 degrees     QTc Int : 428 ms    Normal sinus rhythm with sinus arrhythmia  Normal ECG  When compared with ECG of 16-OCT-2022 06:24,  No significant change was found  Confirmed by Vignesh Ignacio MD [id number]) on 11/21/2022 10:09:22 AM    Referred By: [email or web address]   SELF           Confirmed By:Vignesh Ignacio MD   Continue Losartan 50 mg daily, HCTZ 25 mg daily, Atenolol 25 mg daily  DASH diet  Encouraged home blood pressure monitoring    4. Gastroesophageal reflux disease, unspecified whether esophagitis present  Continue Prilosec 40 mg Qam, Pepcid 40 mg Qpm  Avoid spicy foods  Remain in an upright position for at least 30 minutes after consuming meals    5. Great toe pain, left  - Uric Acid; Future    6. Cigarette nicotine dependence without complication  Smoking cessation education provided, encouraged. Informed of smoking cessation program. Patient refused smoking cessation at this time. I spent 3  minutes discussing smoking cessation with patient.     7. Encounter for colorectal cancer screening  - OCCULT BLOOD FECAL IMMUNOASSAY; Future  - OCCULT BLOOD FECAL IMMUNOASSAY      Follow up in about 4 months (around 12/19/2024) for Labs. In addition to their scheduled follow up, the patient has also been instructed to follow up on as needed basis.     Cyndi Chilel, HERVEP

## 2024-12-06 DIAGNOSIS — K21.9 GASTROESOPHAGEAL REFLUX DISEASE WITHOUT ESOPHAGITIS: Primary | Chronic | ICD-10-CM

## 2024-12-06 RX ORDER — FAMOTIDINE 40 MG/1
40 TABLET, FILM COATED ORAL DAILY
Qty: 30 TABLET | Refills: 8 | Status: SHIPPED | OUTPATIENT
Start: 2024-12-06 | End: 2025-12-06

## 2024-12-23 ENCOUNTER — TELEPHONE (OUTPATIENT)
Dept: INTERNAL MEDICINE | Facility: CLINIC | Age: 59
End: 2024-12-23
Payer: OTHER GOVERNMENT

## 2024-12-23 ENCOUNTER — LAB VISIT (OUTPATIENT)
Dept: LAB | Facility: HOSPITAL | Age: 59
End: 2024-12-23
Attending: NURSE PRACTITIONER
Payer: OTHER GOVERNMENT

## 2024-12-23 DIAGNOSIS — M79.675 GREAT TOE PAIN, LEFT: ICD-10-CM

## 2024-12-23 DIAGNOSIS — E11.65 TYPE 2 DIABETES MELLITUS WITH HYPERGLYCEMIA, WITHOUT LONG-TERM CURRENT USE OF INSULIN: Chronic | ICD-10-CM

## 2024-12-23 DIAGNOSIS — E87.6 HYPOKALEMIA: Primary | ICD-10-CM

## 2024-12-23 LAB
ALBUMIN SERPL-MCNC: 3.9 G/DL (ref 3.5–5)
ALBUMIN/GLOB SERPL: 1.5 RATIO (ref 1.1–2)
ALP SERPL-CCNC: 65 UNIT/L (ref 40–150)
ALT SERPL-CCNC: 19 UNIT/L (ref 0–55)
ANION GAP SERPL CALC-SCNC: 8 MEQ/L
AST SERPL-CCNC: 17 UNIT/L (ref 5–34)
BILIRUB SERPL-MCNC: 0.4 MG/DL
BUN SERPL-MCNC: 16.4 MG/DL (ref 9.8–20.1)
CALCIUM SERPL-MCNC: 9.9 MG/DL (ref 8.4–10.2)
CHLORIDE SERPL-SCNC: 106 MMOL/L (ref 98–107)
CO2 SERPL-SCNC: 28 MMOL/L (ref 22–29)
CREAT SERPL-MCNC: 0.77 MG/DL (ref 0.55–1.02)
CREAT/UREA NIT SERPL: 21
EST. AVERAGE GLUCOSE BLD GHB EST-MCNC: 165.7 MG/DL
GFR SERPLBLD CREATININE-BSD FMLA CKD-EPI: >60 ML/MIN/1.73/M2
GLOBULIN SER-MCNC: 2.6 GM/DL (ref 2.4–3.5)
GLUCOSE SERPL-MCNC: 169 MG/DL (ref 74–100)
HBA1C MFR BLD: 7.4 %
POTASSIUM SERPL-SCNC: 3.4 MMOL/L (ref 3.5–5.1)
PROT SERPL-MCNC: 6.5 GM/DL (ref 6.4–8.3)
SODIUM SERPL-SCNC: 142 MMOL/L (ref 136–145)
URATE SERPL-MCNC: 5 MG/DL (ref 2.6–6)

## 2024-12-23 PROCEDURE — 84550 ASSAY OF BLOOD/URIC ACID: CPT

## 2024-12-23 PROCEDURE — 83036 HEMOGLOBIN GLYCOSYLATED A1C: CPT

## 2024-12-23 PROCEDURE — 80053 COMPREHEN METABOLIC PANEL: CPT

## 2024-12-23 PROCEDURE — 36415 COLL VENOUS BLD VENIPUNCTURE: CPT

## 2024-12-23 RX ORDER — POTASSIUM CHLORIDE 20 MEQ/1
40 TABLET, EXTENDED RELEASE ORAL DAILY
Qty: 60 TABLET | Refills: 5 | Status: SHIPPED | OUTPATIENT
Start: 2024-12-23 | End: 2025-12-23

## 2024-12-23 NOTE — TELEPHONE ENCOUNTER
Called to inform pt of labs. Instructed pt to increase potassium to 40 meq per day. She verbalized understanding.

## 2024-12-23 NOTE — TELEPHONE ENCOUNTER
----- Message from PADMINI Junior sent at 12/23/2024  1:52 PM CST -----  Please inform Maria Luisa Boyle of the following:    Potassium is low, increase potassium to 40 meq daily. Will repeat labs at your next office visit.    For any questions, please let me know.  Thank you,    EMMA Cantu

## 2024-12-23 NOTE — PROGRESS NOTES
Please inform Maria Luisa Boyle of the following:    Potassium is low, increase potassium to 40 meq daily. Will repeat labs at your next office visit.    For any questions, please let me know.  Thank you,    EMMA Cantu

## 2024-12-30 NOTE — PROGRESS NOTES
Erika Moran, PADMINI   [facility]  [address] [facility] [address]      PATIENT NAME: Maria Luisa Boyle  : 1965  DATE: 24  MRN: [id number]      Reason for Visit / Chief Complaint: Diabetes (Takes medications as prescribed )       History of Present Illness / Problem Focused Workflow     Maria Luisa Boyle presents to the clinic with Diabetes (Takes medications as prescribed )     58 yo AAF presents to clinic. Medical problems include HTN, HLD, DM2, right knee OA, GERD, tobacco use.     24  Pt presents for DM follow up, LOV in August metformin was inc to 1000mg BID. A1C improved to 7.4 from 8.4. Repeat labs with continued hypokalemia, Monica MARTIN increased K to 40meq/day which she has been compliant with. Repeat BMP ordered, she will complete today. She is complaining of gout flare ups in great toe when she eats steak. She would like to start, daily medication for it. She is agreeable to mammogram and referral for colonoscopy. Denies other complaints. Declines refills other than metformin. Virtual follow up Friday for lab review.           Review of Systems     Review of Systems   Constitutional:  Negative for fatigue, fever and unexpected weight change.   HENT:  Negative for ear pain, hearing loss, trouble swallowing and voice change.    Respiratory:  Negative for cough and shortness of breath.    Cardiovascular:  Negative for chest pain and palpitations.   Gastrointestinal:  Negative for abdominal pain, diarrhea and vomiting.   Genitourinary:  Negative for dysuria.   Musculoskeletal:  Negative for gait problem.   Skin:  Negative for rash and wound.   Neurological:  Negative for weakness.   Psychiatric/Behavioral:  Negative for suicidal ideas.          Medications and Allergies     Medications  Medication List with Changes/Refills   New Medications    ALLOPURINOL (ZYLOPRIM) 100 MG TABLET    Take 1 tablet (100 mg total) by mouth  once daily.   Current Medications    ATENOLOL (TENORMIN) 25 MG TABLET    TAKE 1 TABLET BY MOUTH DAILY    FAMOTIDINE (PEPCID) 40 MG TABLET    Take 1 tablet (40 mg total) by mouth once daily.    HYDROCHLOROTHIAZIDE (HYDRODIURIL) 25 MG TABLET    Take 1 tablet (25 mg total) by mouth once daily.    IBUPROFEN (ADVIL,MOTRIN) 800 MG TABLET    Take 1 tablet (800 mg total) by mouth 3 (three) times daily.    LOSARTAN (COZAAR) 50 MG TABLET    Take 1 tablet (50 mg total) by mouth once daily.    OMEPRAZOLE (PRILOSEC) 40 MG CAPSULE    Take 1 capsule (40 mg total) by mouth every morning.    POTASSIUM CHLORIDE SA (K-DUR,KLOR-CON) 20 MEQ TABLET    Take 2 tablets (40 mEq total) by mouth once daily.    SIMVASTATIN (ZOCOR) 20 MG TABLET    Take 1 tablet (20 mg total) by mouth every evening.   Changed and/or Refilled Medications    Modified Medication Previous Medication    METFORMIN (GLUCOPHAGE) 1000 MG TABLET metFORMIN (GLUCOPHAGE) 500 MG tablet       Take 1 tablet (1,000 mg total) by mouth 2 (two) times a day.    Take 2 tablets (1,000 mg total) by mouth 2 (two) times a day.         Allergies  Review of patient's allergies indicates:  No Known Allergies    Physical Examination     Vitals:    12/31/24 0717   BP: 126/84   Pulse: 69   Resp: 16   Temp: 98.1 °F (36.7 °C)     Physical Exam  Vitals and nursing note reviewed.   Constitutional:       General: She is not in acute distress.     Appearance: She is not ill-appearing.   HENT:      Head: Normocephalic and atraumatic.      Mouth/Throat:      Mouth: Mucous membranes are moist.      Pharynx: Oropharynx is clear.   Eyes:      General: No scleral icterus.     Extraocular Movements: Extraocular movements intact.      Conjunctiva/sclera: Conjunctivae normal.      Pupils: Pupils are equal, round, and reactive to light.   Neck:      Vascular: No carotid bruit.   Cardiovascular:      Rate and Rhythm: Normal rate and regular rhythm.      Heart sounds: No murmur heard.     No friction rub. No  gallop.   Pulmonary:      Effort: Pulmonary effort is normal. No respiratory distress.      Breath sounds: Normal breath sounds. No wheezing, rhonchi or rales.   Abdominal:      General: Abdomen is flat. Bowel sounds are normal. There is no distension.      Palpations: Abdomen is soft. There is no mass.      Tenderness: There is no abdominal tenderness.   Musculoskeletal:         General: Normal range of motion.      Cervical back: Normal range of motion and neck supple.   Skin:     General: Skin is warm and dry.   Neurological:      General: No focal deficit present.      Mental Status: She is alert.   Psychiatric:         Mood and Affect: Mood normal.           Results     Lab Results   Component Value Date    WBC 5.95 08/12/2024    RBC 4.18 (L) 08/12/2024    HGB 12.3 08/12/2024    HCT 38.3 08/12/2024    MCV 91.6 08/12/2024    MCH 29.4 08/12/2024    MCHC 32.1 (L) 08/12/2024    RDW 13.0 08/12/2024     08/12/2024    MPV 11.9 (H) 08/12/2024     Sodium   Date Value Ref Range Status   12/23/2024 142 136 - 145 mmol/L Final     Potassium   Date Value Ref Range Status   12/23/2024 3.4 (L) 3.5 - 5.1 mmol/L Final     Chloride   Date Value Ref Range Status   12/23/2024 106 98 - 107 mmol/L Final     CO2   Date Value Ref Range Status   12/23/2024 28 22 - 29 mmol/L Final     Blood Urea Nitrogen   Date Value Ref Range Status   12/23/2024 16.4 9.8 - 20.1 mg/dL Final     Creatinine   Date Value Ref Range Status   12/23/2024 0.77 0.55 - 1.02 mg/dL Final     Calcium   Date Value Ref Range Status   12/23/2024 9.9 8.4 - 10.2 mg/dL Final     Albumin   Date Value Ref Range Status   12/23/2024 3.9 3.5 - 5.0 g/dL Final     Bilirubin Total   Date Value Ref Range Status   12/23/2024 0.4 <=1.5 mg/dL Final     ALP   Date Value Ref Range Status   12/23/2024 65 40 - 150 unit/L Final     AST   Date Value Ref Range Status   12/23/2024 17 5 - 34 unit/L Final     ALT   Date Value Ref Range Status   12/23/2024 19 0 - 55 unit/L Final      Estimated GFR-Non    Date Value Ref Range Status   10/21/2021 85 (L) >>=90 mL/min/1.73 m2 Final     Lab Results   Component Value Date    CHOL 145 08/12/2024     Lab Results   Component Value Date    HDL 51 08/12/2024     Lab Results   Component Value Date    TRIG 76 08/12/2024     Lab Results   Component Value Date    VLDL 15 08/12/2024     Lab Results   Component Value Date    LDL 79.00 08/12/2024     Lab Results   Component Value Date    TSH 3.058 08/12/2024     Lab Results   Component Value Date    PHUR 6.0 08/12/2024    PROTEINUA 1+ (A) 08/12/2024    GLUCUA Normal 08/12/2024    KETONESU Trace (A) 10/22/2020    OCCULTUA Negative 08/12/2024    NITRITE Negative 08/12/2024    LEUKOCYTESUR Negative 08/12/2024     Lab Results   Component Value Date    HGBA1C 7.4 (H) 12/23/2024    HGBA1C 8.4 (H) 08/12/2024    HGBA1C 6.7 01/19/2024           Assessment         ICD-10-CM ICD-9-CM   1. Type 2 diabetes mellitus with hyperglycemia, without long-term current use of insulin  E11.65 250.00     790.29   2. Primary hypertension  I10 401.9   3. Mixed hyperlipidemia  E78.2 272.2   4. Gout, unspecified cause, unspecified chronicity, unspecified site  M10.9 274.9   5. Screening for colorectal cancer  Z12.11 V76.51    Z12.12 V76.41   6. Need for hepatitis C screening test  Z11.59 V73.89   7. Screening for HIV (human immunodeficiency virus)  Z11.4 V73.89   8. Breast cancer screening by mammogram  Z12.31 V76.12   9. Hypokalemia  E87.6 276.8       Plan      Problem List Items Addressed This Visit       Type 2 diabetes mellitus with hyperglycemia, without long-term current use of insulin - Primary (Chronic)    Current Assessment & Plan     A1C improved to 7.4  Continue metformin 1000mg BID  Follow ADA Diet. Avoid soda, simple sweets, and limit rice/pasta/breads/starches (no more than 45-50 grams per meal).  Maintain healthy weight with goal BMI <30.  Exercise 5 times per week for 30 minutes per day.  Stressed  importance of daily foot exams.  Stressed importance of annual dilated eye exam.           Relevant Medications    metFORMIN (GLUCOPHAGE) 1000 MG tablet    Hyperlipidemia (Chronic)    Current Assessment & Plan     At goal  Continue simvastatin 20mg  Stressed importance of dietary modifications. Follow a low cholesterol, low saturated fat diet with less that 200mg of cholesterol a day.  Avoid fried foods and high saturated fats (high saturated fats less than 7% of calories).  Add Flax Seed/Fish Oil supplements to diet. Increase dietary fiber.  Regular exercise can reduce LDL and raise HDL. Stressed importance of physical activity 5 times per week for 30 minutes per day.            Hypertension (Chronic)    Current Assessment & Plan     At goal.  Continue HCTZ, losartan  Low Sodium Diet (DASH Diet - Less than 2 grams of sodium per day).  Monitor blood pressure daily and log. Report consistent numbers greater than 140/90.  Maintain healthy weight with goal BMI <30. Exercise 30 minutes per day, 5 days per week.  Smoking cessation encouraged to aid in BP reduction.           Idiopathic gout involving toe    Current Assessment & Plan     Lab Results   Component Value Date    LABURIC 5.0 12/23/2024   Repeat uric acid today  Start allopurinol 100mg daily  Stay well hydrated by increasing water intake throughout the day.  Stressed importance of exercise and weight loss to maintain BMI <30.  Avoid alcohol, sodas, organ/glandular meats (liver, kidney, sweetbreads). Limit seafood and red meat intake.  Eat a balanced diet of fruits, vegetables, complex carbohydrates, and lean sources of protein (boneless/skinless chicken breasts, salmon, lentils, low fat dairy).  Discussed possible benefit of OTC Vitamin C supplementation.            Relevant Medications    allopurinoL (ZYLOPRIM) 100 MG tablet    Hypokalemia    Current Assessment & Plan     Continue K-dur 40meq daily  Repeat BMP today  Discussed Potassium Rich Foods including:  Carrots, Broccoli, Potatoes, Beans (dried), Celery, Spinach, Squash, Tomatoes, Avocados, Apricots, Avocado, Cantaloupe, Bananas, Nectarines and Prunes/Figs/Raisins.  Lab Results   Component Value Date    K 3.4 (L) 12/23/2024               Other Visit Diagnoses       Screening for colorectal cancer        Relevant Orders    Ambulatory referral/consult to Gastroenterology    Need for hepatitis C screening test        Relevant Orders    Hepatitis C Antibody    Screening for HIV (human immunodeficiency virus)        Relevant Orders    HIV 1/2 Ag/Ab (4th Gen)    Breast cancer screening by mammogram        Relevant Orders    Mammo Digital Screening Bilat w/ Dayron            Future Appointments   Date Time Provider Department Center   12/31/2024  7:40 AM LAB, [facility]   1/3/2025  7:40 AM Erika Moran[facility]    2/21/2025  9:40 AM PROVIDERS, [facility] [place] [facility]            Signature:      [facility]  [address] [facility] [address]    Date of encounter: 12/31/24

## 2024-12-31 ENCOUNTER — LAB VISIT (OUTPATIENT)
Dept: LAB | Facility: HOSPITAL | Age: 59
End: 2024-12-31
Payer: OTHER GOVERNMENT

## 2024-12-31 ENCOUNTER — OFFICE VISIT (OUTPATIENT)
Dept: INTERNAL MEDICINE | Facility: CLINIC | Age: 59
End: 2024-12-31
Payer: OTHER GOVERNMENT

## 2024-12-31 VITALS
HEART RATE: 69 BPM | OXYGEN SATURATION: 100 % | RESPIRATION RATE: 16 BRPM | BODY MASS INDEX: 28.44 KG/M2 | WEIGHT: 181.19 LBS | SYSTOLIC BLOOD PRESSURE: 126 MMHG | TEMPERATURE: 98 F | DIASTOLIC BLOOD PRESSURE: 84 MMHG | HEIGHT: 67 IN

## 2024-12-31 DIAGNOSIS — Z11.59 NEED FOR HEPATITIS C SCREENING TEST: ICD-10-CM

## 2024-12-31 DIAGNOSIS — E87.6 HYPOKALEMIA: ICD-10-CM

## 2024-12-31 DIAGNOSIS — M10.9 GOUT, UNSPECIFIED CAUSE, UNSPECIFIED CHRONICITY, UNSPECIFIED SITE: ICD-10-CM

## 2024-12-31 DIAGNOSIS — Z12.11 SCREENING FOR COLORECTAL CANCER: ICD-10-CM

## 2024-12-31 DIAGNOSIS — Z11.4 SCREENING FOR HIV (HUMAN IMMUNODEFICIENCY VIRUS): ICD-10-CM

## 2024-12-31 DIAGNOSIS — Z12.12 SCREENING FOR COLORECTAL CANCER: ICD-10-CM

## 2024-12-31 DIAGNOSIS — E78.2 MIXED HYPERLIPIDEMIA: Chronic | ICD-10-CM

## 2024-12-31 DIAGNOSIS — Z12.31 BREAST CANCER SCREENING BY MAMMOGRAM: ICD-10-CM

## 2024-12-31 DIAGNOSIS — I10 PRIMARY HYPERTENSION: Chronic | ICD-10-CM

## 2024-12-31 DIAGNOSIS — E11.65 TYPE 2 DIABETES MELLITUS WITH HYPERGLYCEMIA, WITHOUT LONG-TERM CURRENT USE OF INSULIN: Primary | Chronic | ICD-10-CM

## 2024-12-31 PROBLEM — M10.079 IDIOPATHIC GOUT INVOLVING TOE: Status: ACTIVE | Noted: 2024-12-31

## 2024-12-31 LAB
ANION GAP SERPL CALC-SCNC: 8 MEQ/L
BUN SERPL-MCNC: 10.8 MG/DL (ref 9.8–20.1)
CALCIUM SERPL-MCNC: 10 MG/DL (ref 8.4–10.2)
CHLORIDE SERPL-SCNC: 105 MMOL/L (ref 98–107)
CO2 SERPL-SCNC: 28 MMOL/L (ref 22–29)
CREAT SERPL-MCNC: 0.72 MG/DL (ref 0.55–1.02)
CREAT/UREA NIT SERPL: 15
GFR SERPLBLD CREATININE-BSD FMLA CKD-EPI: >60 ML/MIN/1.73/M2
GLUCOSE SERPL-MCNC: 160 MG/DL (ref 74–100)
HCV AB SERPL QL IA: NONREACTIVE
HIV 1+2 AB+HIV1 P24 AG SERPL QL IA: NONREACTIVE
MAGNESIUM SERPL-MCNC: 1 MG/DL (ref 1.6–2.6)
POTASSIUM SERPL-SCNC: 4.1 MMOL/L (ref 3.5–5.1)
SODIUM SERPL-SCNC: 141 MMOL/L (ref 136–145)
URATE SERPL-MCNC: 3.8 MG/DL (ref 2.6–6)

## 2024-12-31 PROCEDURE — 80048 BASIC METABOLIC PNL TOTAL CA: CPT

## 2024-12-31 PROCEDURE — 83735 ASSAY OF MAGNESIUM: CPT

## 2024-12-31 PROCEDURE — 87389 HIV-1 AG W/HIV-1&-2 AB AG IA: CPT

## 2024-12-31 PROCEDURE — 86803 HEPATITIS C AB TEST: CPT

## 2024-12-31 PROCEDURE — 99214 OFFICE O/P EST MOD 30 MIN: CPT | Mod: S$PBB,,,

## 2024-12-31 PROCEDURE — 99214 OFFICE O/P EST MOD 30 MIN: CPT | Mod: PBBFAC

## 2024-12-31 PROCEDURE — 36415 COLL VENOUS BLD VENIPUNCTURE: CPT

## 2024-12-31 PROCEDURE — 84550 ASSAY OF BLOOD/URIC ACID: CPT

## 2024-12-31 RX ORDER — ALLOPURINOL 100 MG/1
100 TABLET ORAL DAILY
Qty: 90 TABLET | Refills: 2 | Status: SHIPPED | OUTPATIENT
Start: 2024-12-31

## 2024-12-31 RX ORDER — METFORMIN HYDROCHLORIDE 1000 MG/1
1000 TABLET ORAL 2 TIMES DAILY
Qty: 90 TABLET | Refills: 2 | Status: SHIPPED | OUTPATIENT
Start: 2024-12-31

## 2024-12-31 NOTE — ASSESSMENT & PLAN NOTE
Lab Results   Component Value Date    LABURIC 5.0 12/23/2024   Repeat uric acid today  Start allopurinol 100mg daily  Stay well hydrated by increasing water intake throughout the day.  Stressed importance of exercise and weight loss to maintain BMI <30.  Avoid alcohol, sodas, organ/glandular meats (liver, kidney, sweetbreads). Limit seafood and red meat intake.  Eat a balanced diet of fruits, vegetables, complex carbohydrates, and lean sources of protein (boneless/skinless chicken breasts, salmon, lentils, low fat dairy).  Discussed possible benefit of OTC Vitamin C supplementation.

## 2024-12-31 NOTE — ASSESSMENT & PLAN NOTE
A1C improved to 7.4  Continue metformin 1000mg BID  Follow ADA Diet. Avoid soda, simple sweets, and limit rice/pasta/breads/starches (no more than 45-50 grams per meal).  Maintain healthy weight with goal BMI <30.  Exercise 5 times per week for 30 minutes per day.  Stressed importance of daily foot exams.  Stressed importance of annual dilated eye exam.

## 2024-12-31 NOTE — ASSESSMENT & PLAN NOTE
At goal  Continue simvastatin 20mg  Stressed importance of dietary modifications. Follow a low cholesterol, low saturated fat diet with less that 200mg of cholesterol a day.  Avoid fried foods and high saturated fats (high saturated fats less than 7% of calories).  Add Flax Seed/Fish Oil supplements to diet. Increase dietary fiber.  Regular exercise can reduce LDL and raise HDL. Stressed importance of physical activity 5 times per week for 30 minutes per day.

## 2024-12-31 NOTE — ASSESSMENT & PLAN NOTE
Continue K-dur 40meq daily  Repeat BMP today  Discussed Potassium Rich Foods including: Carrots, Broccoli, Potatoes, Beans (dried), Celery, Spinach, Squash, Tomatoes, Avocados, Apricots, Avocado, Cantaloupe, Bananas, Nectarines and Prunes/Figs/Raisins.  Lab Results   Component Value Date    K 3.4 (L) 12/23/2024

## 2024-12-31 NOTE — ASSESSMENT & PLAN NOTE
At goal.  Continue HCTZ, losartan  Low Sodium Diet (DASH Diet - Less than 2 grams of sodium per day).  Monitor blood pressure daily and log. Report consistent numbers greater than 140/90.  Maintain healthy weight with goal BMI <30. Exercise 30 minutes per day, 5 days per week.  Smoking cessation encouraged to aid in BP reduction.

## 2025-01-24 ENCOUNTER — TELEPHONE (OUTPATIENT)
Dept: INTERNAL MEDICINE | Facility: CLINIC | Age: 60
End: 2025-01-24
Payer: OTHER GOVERNMENT

## 2025-01-24 NOTE — TELEPHONE ENCOUNTER
----- Message from Danae sent at 1/24/2025  9:57 AM CST -----  Who Called: Maria Luisa Boyle    Refill or New Rx:Refill  RX Name and Strength:omeprazole (PRILOSEC) 40 MG capsule  How is the patient currently taking it? (ex. 1XDay):Take 1 capsule (40 mg total) by mouth every morning. - Oral  Is this a 30 day or 90 day RX:90  Local or Mail Order:local   List of preferred pharmacies on file (remove unneeded):[id number] - [place], [place] - [address] AT [address] & [address]      Ordering Provider:Cyndi Chilel FNP      Preferred Method of Contact: Phone Call  Patient's Preferred Phone Number on File: [phone]   Best Call Back Number, if different:  Additional Information:  Pt stated that she has no more medication.

## 2025-01-27 ENCOUNTER — TELEPHONE (OUTPATIENT)
Dept: INTERNAL MEDICINE | Facility: CLINIC | Age: 60
End: 2025-01-27
Payer: OTHER GOVERNMENT

## 2025-01-27 NOTE — TELEPHONE ENCOUNTER
----- Message from Yosi sent at 1/27/2025  8:24 AM CST -----  .Who Called: Maria Luisa Boyle    Caller is requesting assistance/information from provider's office.    Symptoms (please be specific): Pt states that she needs a PA for the medication omeprazole (PRILOSEC) 40 MG capsule. States that she is calling because it is going on two weeks of waiting for the response    How long has patient had these symptoms:    List of preferred pharmacies on file (remove unneeded): [unfilled]  If different, enter pharmacy into here including location and phone number:       Preferred Method of Contact: Phone Call  Patient's Preferred Phone Number on File: [phone]   Best Call Back Number, if different:  Additional Information:

## 2025-01-28 ENCOUNTER — TELEPHONE (OUTPATIENT)
Dept: INTERNAL MEDICINE | Facility: CLINIC | Age: 60
End: 2025-01-28
Payer: OTHER GOVERNMENT

## 2025-01-28 NOTE — TELEPHONE ENCOUNTER
She was put through to us from the call center saying that she was upset because she has been calling for 3 weeks for her stomach meds and still has not gotten them. After researching the chart, with PA was sent to us yesterday and we initiated it and are awaiting a response. I explained that to Miss Glass and she said that she had been calling the pharmacy for 3 weeks. I apologized and told her that we got it yesterday and are working on getting it approved.  She thanked me.

## 2025-01-30 DIAGNOSIS — K21.9 GASTROESOPHAGEAL REFLUX DISEASE, UNSPECIFIED WHETHER ESOPHAGITIS PRESENT: ICD-10-CM

## 2025-01-30 RX ORDER — OMEPRAZOLE 40 MG/1
40 CAPSULE, DELAYED RELEASE ORAL EVERY MORNING
Qty: 90 CAPSULE | Refills: 2 | Status: SHIPPED | OUTPATIENT
Start: 2025-01-30

## 2025-01-30 NOTE — TELEPHONE ENCOUNTER
----- Message from Marifer sent at 1/30/2025 11:50 AM CST -----  Regarding: med refill  Patient is requesting refill on Omeprazole

## 2025-02-06 ENCOUNTER — HOSPITAL ENCOUNTER (OUTPATIENT)
Dept: RADIOLOGY | Facility: HOSPITAL | Age: 60
Discharge: HOME | End: 2025-02-06
Payer: OTHER GOVERNMENT

## 2025-02-06 DIAGNOSIS — Z12.31 BREAST CANCER SCREENING BY MAMMOGRAM: ICD-10-CM

## 2025-02-06 PROCEDURE — 77067 SCR MAMMO BI INCL CAD: CPT | Mod: 26,,, | Performed by: RADIOLOGY

## 2025-02-06 PROCEDURE — 77067 SCR MAMMO BI INCL CAD: CPT | Mod: TC

## 2025-02-06 PROCEDURE — 77063 BREAST TOMOSYNTHESIS BI: CPT | Mod: 26,,, | Performed by: RADIOLOGY

## 2025-02-06 RX ORDER — ATENOLOL 25 MG/1
TABLET ORAL
Qty: 90 TABLET | Refills: 2 | Status: SHIPPED | OUTPATIENT
Start: 2025-02-06

## 2025-02-14 ENCOUNTER — TELEPHONE (OUTPATIENT)
Dept: INTERNAL MEDICINE | Facility: CLINIC | Age: 60
End: 2025-02-14
Payer: OTHER GOVERNMENT

## 2025-02-14 DIAGNOSIS — E83.42 HYPOMAGNESEMIA: Primary | ICD-10-CM

## 2025-02-14 NOTE — TELEPHONE ENCOUNTER
Unable to complete virtual today. Called pt to inform her hypokalemia resolved with inc dose of kdur. She is to continue current dose. Uric acid WNL but pt would still like to continue trial of allopurinol daily due to gout flare ups. Recommended OTC magnesium 400mg supplement daily. follow up in 4 months with labs for DM and labs

## 2025-05-01 ENCOUNTER — HOSPITAL ENCOUNTER (EMERGENCY)
Facility: HOSPITAL | Age: 60
Discharge: HOME | End: 2025-05-02
Attending: EMERGENCY MEDICINE
Payer: OTHER GOVERNMENT

## 2025-05-01 DIAGNOSIS — S63.501A SPRAIN OF RIGHT WRIST, INITIAL ENCOUNTER: Primary | ICD-10-CM
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
## 2025-05-01 PROCEDURE — 99283 EMERGENCY DEPT VISIT LOW MDM: CPT

## 2025-05-01 PROCEDURE — 29125 APPL SHORT ARM SPLINT STATIC: CPT | Mod: RT

## 2025-05-02 VITALS
SYSTOLIC BLOOD PRESSURE: 156 MMHG | BODY MASS INDEX: 30.14 KG/M2 | HEIGHT: 65 IN | TEMPERATURE: 99 F | HEART RATE: 84 BPM | DIASTOLIC BLOOD PRESSURE: 91 MMHG | OXYGEN SATURATION: 100 % | RESPIRATION RATE: 18 BRPM | WEIGHT: 180.88 LBS

## 2025-05-02 PROCEDURE — 25000003 PHARM REV CODE 250: Performed by: EMERGENCY MEDICINE

## 2025-05-02 RX ORDER — OXYCODONE AND ACETAMINOPHEN 5; 325 MG/1; MG/1
1 TABLET ORAL EVERY 6 HOURS PRN
Qty: 14 TABLET | Refills: 0 | Status: SHIPPED | OUTPATIENT
Start: 2025-05-02

## 2025-05-02 RX ORDER — HYDROCODONE BITARTRATE AND ACETAMINOPHEN 10; 325 MG/1; MG/1
1 TABLET ORAL
Refills: 0 | Status: DISCONTINUED | OUTPATIENT
Start: 2025-05-02 | End: 2025-05-02

## 2025-05-02 RX ORDER — OXYCODONE AND ACETAMINOPHEN 5; 325 MG/1; MG/1
1 TABLET ORAL
Refills: 0 | Status: COMPLETED | OUTPATIENT
Start: 2025-05-02 | End: 2025-05-02

## 2025-05-02 RX ADMIN — OXYCODONE HYDROCHLORIDE AND ACETAMINOPHEN 1 TABLET: 5; 325 TABLET ORAL at 12:05

## 2025-05-02 NOTE — DISCHARGE INSTRUCTIONS
Referral was made to the [facility], they will get in touch with you with appointment time.

## 2025-05-02 NOTE — ED PROVIDER NOTES
Encounter Date: 5/1/2025       History     Chief Complaint   Patient presents with    Wrist Injury     Pt c/o of severe R sided wrist pain/swelling, with limited ROM. Pt tripped, fell over grandsons toy and landed on wrist. Bilateral radial pulses +2. Hx of sx to R arm requiring plates and screws.     Patient is a 59-year-old female presenting with complaint of pain and swelling to the right wrist.  Patient state she tripped at home over her grand child's toy prior to arrival.   She states she landed on an outstretched right hand.  Patient denies any other complaints.  No head trauma, no neck pain.       Review of patient's allergies indicates:  No Known Allergies  Past Medical History:   Diagnosis Date    Diabetes mellitus     Hypertension      Past Surgical History:   Procedure Laterality Date    HYSTERECTOMY      ORIF FOREARM FRACTURE Right     plates and screws in place     Family History   Problem Relation Name Age of Onset    Hypertension Mother      Hyperlipidemia Mother      Diabetes Mother      Cancer Father      Hypertension Father      Hyperlipidemia Father      Cataracts Maternal Aunt      Cataracts Paternal Grandmother       Social History[1]  Review of Systems   Constitutional: Negative.    Respiratory: Negative.     Cardiovascular: Negative.    Gastrointestinal: Negative.    Genitourinary: Negative.    Musculoskeletal:  Positive for arthralgias, joint swelling and myalgias. Negative for back pain, gait problem and neck pain.       Physical Exam     Initial Vitals [05/01/25 2321]   BP Pulse Resp Temp SpO2   121/78 98 18 99.1 °F (37.3 °C) 98 %      MAP       --         Physical Exam    Nursing note and vitals reviewed.  Constitutional: She appears well-developed and well-nourished.   HENT:   Head: Normocephalic and atraumatic.   Neck: Neck supple.   Normal range of motion.  Cardiovascular:  Normal rate, regular rhythm and normal heart sounds.           Pulmonary/Chest: Breath sounds normal. No  respiratory distress. She has no wheezes. She has no rhonchi. She has no rales.   Abdominal: Abdomen is soft. There is no abdominal tenderness. There is no guarding.   Musculoskeletal:      Cervical back: Normal range of motion and neck supple.      Comments: Patient has swelling and tenderness to palpation to the dorsum of the right wrist.  Patient has limited range of motion of the right wrist secondary to pain.  Skin is intact.  Patient has a strong right radial pulse.  Sensation is intact to all digits of the right hand.     Neurological: She is alert and oriented to person, place, and time. She has normal strength.   Psychiatric: She has a normal mood and affect. Thought content normal.         ED Course   Procedures  Labs Reviewed - No data to display       Imaging Results              X-Ray Wrist Complete Right (In process)                   X-Rays:   Independently Interpreted Readings:   Other Readings:  No acute changes seen on x-rays of the right wrist    Medications   oxyCODONE-acetaminophen 5-325 mg per tablet 1 tablet (1 tablet Oral Given 5/2/25 0025)     Medical Decision Making  Differential diagnosis:  Radial fracture, ulnar fracture, carpal bone fracture, wrist sprain    Amount and/or Complexity of Data Reviewed  Radiology: ordered.  Discussion of management or test interpretation with external provider(s): No fracture seen on x-rays of the right wrist.  Splint was applied.  Will DC to home with a prescription for Percocet.    Risk  Prescription drug management.                                      Clinical Impression:  Final diagnoses:  [W19.XXXA] Fall  [S63.501A] Sprain of right wrist, initial encounter (Primary)          ED Disposition Condition    Discharge Stable          ED Prescriptions       Medication Sig Dispense Start Date End Date Auth. Provider    oxyCODONE-acetaminophen (PERCOCET) 5-325 mg per tablet Take 1 tablet by mouth every 6 (six) hours as needed for Pain. 14 tablet 5/2/2025 --  George Fu MD          Follow-up Information       Follow up With Specialties Details Why Contact Info    [facility] Emergency Medicine  If symptoms worsen [address] [id number] [phone]    Erika Moran, [facility] Internal Medicine In 1 week  [address] [address]  [phone]               George Fu MD  05/02/25 0027         [1]   Social History  Tobacco Use    Smoking status: Every Day     Types: Cigarettes    Smokeless tobacco: Never    Tobacco comments:     2  PER DAY   Substance Use Topics    Alcohol use: Yes     Comment: ocassionally wine/ liquor    Drug use: Never        George Fu MD  05/02/25 0030

## 2025-05-09 RX ORDER — LOSARTAN POTASSIUM 50 MG/1
50 TABLET ORAL DAILY
Qty: 90 TABLET | Refills: 2 | Status: SHIPPED | OUTPATIENT
Start: 2025-05-09

## 2025-05-09 RX ORDER — HYDROCHLOROTHIAZIDE 25 MG/1
25 TABLET ORAL DAILY
Qty: 90 TABLET | Refills: 2 | Status: SHIPPED | OUTPATIENT
Start: 2025-05-09

## 2025-05-12 DIAGNOSIS — E11.65 TYPE 2 DIABETES MELLITUS WITH HYPERGLYCEMIA, WITHOUT LONG-TERM CURRENT USE OF INSULIN: Chronic | ICD-10-CM

## 2025-05-12 RX ORDER — METFORMIN HYDROCHLORIDE 1000 MG/1
1000 TABLET ORAL 2 TIMES DAILY
Qty: 90 TABLET | Refills: 2 | Status: SHIPPED | OUTPATIENT
Start: 2025-05-12

## 2025-05-16 ENCOUNTER — OFFICE VISIT (OUTPATIENT)
Dept: ORTHOPEDICS | Facility: CLINIC | Age: 60
End: 2025-05-16
Payer: OTHER GOVERNMENT

## 2025-05-16 VITALS
DIASTOLIC BLOOD PRESSURE: 72 MMHG | SYSTOLIC BLOOD PRESSURE: 118 MMHG | WEIGHT: 179.81 LBS | HEART RATE: 70 BPM | BODY MASS INDEX: 29.96 KG/M2 | HEIGHT: 65 IN

## 2025-05-16 DIAGNOSIS — S63.501A SPRAIN OF RIGHT WRIST, INITIAL ENCOUNTER: Primary | ICD-10-CM

## 2025-05-16 RX ORDER — TRAMADOL HYDROCHLORIDE 50 MG/1
50 TABLET, FILM COATED ORAL EVERY 6 HOURS PRN
Qty: 28 TABLET | Refills: 0 | Status: SHIPPED | OUTPATIENT
Start: 2025-05-16 | End: 2025-05-23

## 2025-05-16 NOTE — PROGRESS NOTES
Orthopaedic Clinic  Orthopedic Clinic Note      Chief Complaint:   Chief Complaint   Patient presents with    Right Wrist - Injury     DOI 4/30/25, due to a trip and fall, Hx of sx 13 yrs ago- Stated is constant pain throughout entire hand. Present today wearing brace. Pain radiates from wrist into palm of hand. Has swelling in the fingers at times. Denies any numbness or tingling. Is taking ibuprofen 800 mg but has not helped with pain.      Referring Physician: George Fu MD      History of Present Illness:    This is a 59 y.o. year old female presenting with complaints of right wrist pain since an injury that occurred on 04/30/2025.  Patient states that she was ambulating in her home when she tripped over a grandchild's toy and broke her fall with her outstretched right hand.  She reports pain over the distal radius and ulna, exacerbated by wrist extension ulnar and radial deviation, and supination/pronation.  She reports there was swelling that has improved somewhat.  Overall, pain has not improved and she reports constant pain radiating throughout the hand.  She was evaluated in the ED following the initial injury where x-rays were performed.  X-rays of the right wrist demonstrated no acute osseous pathology.  She was placed in a volar wrist splint with finger immobilization, but she states that this has not been helpful at all.  Taking ibuprofen 800 mg with no improvement in symptoms.  Denies numbness and tingling.  Instructed to follow up as an outpatient with Orthopedics for further evaluation recommendations.      Past Medical History:   Diagnosis Date    Diabetes mellitus     Hypertension        Past Surgical History:   Procedure Laterality Date    HYSTERECTOMY      ORIF FOREARM FRACTURE Right     plates and screws in place       Current Outpatient Medications   Medication Sig    allopurinoL (ZYLOPRIM) 100 MG tablet Take 1 tablet (100 mg total) by mouth once daily.    atenoloL (TENORMIN) 25 MG  "tablet TAKE 1 TABLET BY MOUTH DAILY    famotidine (PEPCID) 40 MG tablet Take 1 tablet (40 mg total) by mouth once daily.    hydroCHLOROthiazide (HYDRODIURIL) 25 MG tablet Take 1 tablet (25 mg total) by mouth once daily.    ibuprofen (ADVIL,MOTRIN) 800 MG tablet Take 1 tablet (800 mg total) by mouth 3 (three) times daily.    losartan (COZAAR) 50 MG tablet Take 1 tablet (50 mg total) by mouth once daily.    metFORMIN (GLUCOPHAGE) 1000 MG tablet Take 1 tablet (1,000 mg total) by mouth 2 (two) times a day.    omeprazole (PRILOSEC) 40 MG capsule Take 1 capsule (40 mg total) by mouth every morning.    potassium chloride SA (K-DUR,KLOR-CON) 20 MEQ tablet Take 2 tablets (40 mEq total) by mouth once daily.    simvastatin (ZOCOR) 20 MG tablet Take 1 tablet (20 mg total) by mouth every evening.    oxyCODONE-acetaminophen (PERCOCET) 5-325 mg per tablet Take 1 tablet by mouth every 6 (six) hours as needed for Pain. (Patient not taking: Reported on 5/16/2025)    traMADoL (ULTRAM) 50 mg tablet Take 1 tablet (50 mg total) by mouth every 6 (six) hours as needed for Pain.     No current facility-administered medications for this visit.       Review of patient's allergies indicates:  No Known Allergies    Family History   Problem Relation Name Age of Onset    Hypertension Mother      Hyperlipidemia Mother      Diabetes Mother      Cancer Father      Hypertension Father      Hyperlipidemia Father      Cataracts Maternal Aunt      Cataracts Paternal Grandmother         Social History[1]        Review of Systems:  All review of systems negative except for those stated in the HPI.    Examination:    Vital Signs:    Vitals:    05/16/25 0820   BP: (!) 194/106   Pulse: 79   Weight: 81.6 kg (179 lb 12.8 oz)   Height: 5' 5" (1.651 m)       Body mass index is 29.92 kg/m².    Physical Examination:  General: Well-developed, well-nourished.  Neuro: Alert and oriented x 3.  Psych: Normal mood and affect.  Card: Regular rate and rhythm  Resp: " Respirations regular and unlabored  Right Wrist Exam:  No obvious deformity, mild swelling. Positive tenderness over distal radius. Tenderness over TFCC. Range of motion limited secondary to pain, most notable limitations in wrist extension and supination/pronation.  Negative flexion-compression test and Phanel´s test.  Negative Finkelstein´s test. 1/5 strength, normal skin appearance and palpable pulses and CR<2.      Imaging:  Prior three views of the right wrist demonstrate no acute osseous pathology.    Assessment: Sprain of right wrist, initial encounter  -     Ambulatory referral/consult to Orthopedics  -     MRI Wrist Joint Without Contrast Right; Future; Expected date: 05/16/2025  -     traMADoL (ULTRAM) 50 mg tablet; Take 1 tablet (50 mg total) by mouth every 6 (six) hours as needed for Pain.  Dispense: 28 tablet; Refill: 0      Plan:  Prior x-rays were reviewed.  Concern for occult fracture, order entered for MRI of the right wrist to assess for bony or ligamentous pathology.  Prescription routed for tramadol to be taken as needed for pain.  Advised that she can continue over-the-counter medications in addition to this medication.  We will transition her to a wrist splint.  Encouraged elevation, ice application, rest.  She will return to clinic for review of MRI results once imaging is obtained.  She verbalized understanding of the plan of care with no further questions.         Follow up for Review of MRI results.      DISCLAIMER: This note may have been dictated using voice recognition software and may contain grammatical errors.     NOTE: Consult report sent to referring provider via [email or web address] EMR.           [1]   Social History  Socioeconomic History    Marital status:    Tobacco Use    Smoking status: Every Day     Types: Cigarettes    Smokeless tobacco: Never    Tobacco comments:     2  PER DAY   Substance and Sexual Activity    Alcohol use: Yes     Comment: ocassionally wine/ liquor    Drug use:  Never    Sexual activity: Yes     Partners: Male     Social Drivers of Health     Financial Resource Strain: High Risk (1/3/2025)    Overall Financial Resource Strain (CARDIA)     Difficulty of Paying Living Expenses: Hard   Food Insecurity: No Food Insecurity (1/3/2025)    Hunger Vital Sign     Worried About Running Out of Food in the Last Year: Never true     Ran Out of Food in the Last Year: Never true   Transportation Needs: No Transportation Needs (2/19/2024)    PRAPARE - Transportation     Lack of Transportation (Medical): No     Lack of Transportation (Non-Medical): No   Physical Activity: Sufficiently Active (1/3/2025)    Exercise Vital Sign     Days of Exercise per Week: 3 days     Minutes of Exercise per Session: 60 min   Stress: No Stress Concern Present (1/3/2025)    [employer] [place] [employer] - Occupational Stress Questionnaire     Feeling of Stress : Only a little   Housing Stability: Low Risk  (2/19/2024)    Housing Stability Vital Sign     Unable to Pay for Housing in the Last Year: No     Number of Places Lived in the Last Year: 1     Unstable Housing in the Last Year: No

## 2025-05-22 ENCOUNTER — OFFICE VISIT (OUTPATIENT)
Dept: ORTHOPEDICS | Facility: CLINIC | Age: 60
End: 2025-05-22
Payer: OTHER GOVERNMENT

## 2025-05-22 VITALS
HEIGHT: 65 IN | SYSTOLIC BLOOD PRESSURE: 119 MMHG | BODY MASS INDEX: 29.97 KG/M2 | DIASTOLIC BLOOD PRESSURE: 77 MMHG | WEIGHT: 179.88 LBS | HEART RATE: 81 BPM

## 2025-05-22 DIAGNOSIS — S60.211A CONTUSION OF RIGHT WRIST, INITIAL ENCOUNTER: Primary | ICD-10-CM

## 2025-05-22 NOTE — PROGRESS NOTES
"Subjective:      Patient ID: Maria Luisa Boyle is a 59 y.o. female.    Chief Complaint: Results (Patient is here for MRI results on RT wrist done 5/19/25. )    HPI:     History of Present Illness    CHIEF COMPLAINT:  - Right wrist injury follow-up    HPI:  Maria Luisa presents for follow-up of a right wrist injury sustained on 04/30/2025 after falling on an outstretched hand. MRI revealed a bony contusion of the carpal bones with no associated fracture. She reports persistent, severe, throbbing, and constant pain in the right wrist, stating, "The pain is continuous and causing significant discomfort." She is currently wearing a wrist brace for support.    PREVIOUS TREATMENTS:  - Ice for pain relief    IMAGING:  - MRI Right Wrist: Bony contusion of the carpal bones with no associated fracture      ROS:  Musculoskeletal: +limb pain          Past Medical History:   Diagnosis Date    Diabetes mellitus     Hypertension      Past Surgical History:   Procedure Laterality Date    HYSTERECTOMY      ORIF FOREARM FRACTURE Right     plates and screws in place     Social History[1]    Current Medications[2]  Review of patient's allergies indicates:  No Known Allergies    /77   Pulse 81   Ht 5' 5" (1.651 m)   Wt 81.6 kg (179 lb 14.3 oz)   BMI 29.94 kg/m²     Comprehensive review of systems completed and negative except as per HPI.        Objective:   General: Well-developed, well-nourished.  Neuro: Alert and oriented x 3.  Psych: Normal mood and affect.  Card: Regular rate and rhythm  Resp: Respirations regular and unlabored    Right Wrist Exam:  No obvious deformity, mild swelling. Positive tenderness over distal radius. Tenderness over TFCC. Range of motion limited secondary to pain, most notable limitations in wrist extension and supination/pronation.  Negative flexion-compression test and Phanel´s test.  Negative Finkelstein´s test. 1/5 strength, normal skin appearance and palpable pulses and CR<2.      Assessment:       "   1. Contusion of right wrist, initial encounter          Plan:             Imaging and exam findings discussed.     Assessment & Plan    FOLLOW UP:  - this is an injury that just has to heal on its own.  Follow up in about 2 months.  If she is still having some issues we may consider physical therapy upon follow up.    PATIENT INSTRUCTIONS:  - Use wrist brace for a minimum of 3-4 weeks.  - Apply ice to the injured area.  - Alternate ice and heat for pain.  - Remove brace when pain subsides significantly, typically after 3-4 weeks.               All questions were answered. Patient happy and in agreement with the plan.     This note was generated with the assistance of ambient listening technology. Verbal consent was obtained by the patient and accompanying visitor(s) for the recording of patient appointment to facilitate this note. I attest to having reviewed and edited the generated note for accuracy, though some syntax or spelling errors may persist. Please contact the author of this note for any clarification.         [1]   Social History  Socioeconomic History    Marital status:    Tobacco Use    Smoking status: Every Day     Types: Cigarettes    Smokeless tobacco: Never    Tobacco comments:     2  PER DAY   Substance and Sexual Activity    Alcohol use: Yes     Comment: ocassionally wine/ liquor    Drug use: Never    Sexual activity: Yes     Partners: Male     Social Drivers of Health     Financial Resource Strain: High Risk (1/3/2025)    Overall Financial Resource Strain (CARDIA)     Difficulty of Paying Living Expenses: Hard   Food Insecurity: No Food Insecurity (1/3/2025)    Hunger Vital Sign     Worried About Running Out of Food in the Last Year: Never true     Ran Out of Food in the Last Year: Never true   Transportation Needs: No Transportation Needs (2/19/2024)    PRAPARE - Transportation     Lack of Transportation (Medical): No     Lack of Transportation (Non-Medical): No   Physical Activity:  Sufficiently Active (1/3/2025)    Exercise Vital Sign     Days of Exercise per Week: 3 days     Minutes of Exercise per Session: 60 min   Stress: No Stress Concern Present (1/3/2025)    [employer] [place] [employer] - Occupational Stress Questionnaire     Feeling of Stress : Only a little   Housing Stability: Low Risk  (2/19/2024)    Housing Stability Vital Sign     Unable to Pay for Housing in the Last Year: No     Number of Places Lived in the Last Year: 1     Unstable Housing in the Last Year: No   [2]   Current Outpatient Medications:     allopurinoL (ZYLOPRIM) 100 MG tablet, Take 1 tablet (100 mg total) by mouth once daily., Disp: 90 tablet, Rfl: 2    atenoloL (TENORMIN) 25 MG tablet, TAKE 1 TABLET BY MOUTH DAILY, Disp: 90 tablet, Rfl: 2    famotidine (PEPCID) 40 MG tablet, Take 1 tablet (40 mg total) by mouth once daily., Disp: 30 tablet, Rfl: 8    hydroCHLOROthiazide (HYDRODIURIL) 25 MG tablet, Take 1 tablet (25 mg total) by mouth once daily., Disp: 90 tablet, Rfl: 2    ibuprofen (ADVIL,MOTRIN) 800 MG tablet, Take 1 tablet (800 mg total) by mouth 3 (three) times daily., Disp: 270 tablet, Rfl: 2    losartan (COZAAR) 50 MG tablet, Take 1 tablet (50 mg total) by mouth once daily., Disp: 90 tablet, Rfl: 2    metFORMIN (GLUCOPHAGE) 1000 MG tablet, Take 1 tablet (1,000 mg total) by mouth 2 (two) times a day., Disp: 90 tablet, Rfl: 2    omeprazole (PRILOSEC) 40 MG capsule, Take 1 capsule (40 mg total) by mouth every morning., Disp: 90 capsule, Rfl: 2    potassium chloride SA (K-DUR,KLOR-CON) 20 MEQ tablet, Take 2 tablets (40 mEq total) by mouth once daily., Disp: 60 tablet, Rfl: 5    simvastatin (ZOCOR) 20 MG tablet, Take 1 tablet (20 mg total) by mouth every evening., Disp: 90 tablet, Rfl: 3    traMADoL (ULTRAM) 50 mg tablet, Take 1 tablet (50 mg total) by mouth every 6 (six) hours as needed for Pain., Disp: 28 tablet, Rfl: 0    oxyCODONE-acetaminophen (PERCOCET) 5-325 mg per tablet, Take 1 tablet by  mouth every 6 (six) hours as needed for Pain. (Patient not taking: Reported on 5/22/2025), Disp: 14 tablet, Rfl: 0

## 2025-05-28 ENCOUNTER — LAB VISIT (OUTPATIENT)
Dept: LAB | Facility: HOSPITAL | Age: 60
End: 2025-05-28
Payer: OTHER GOVERNMENT

## 2025-05-28 ENCOUNTER — RESULTS FOLLOW-UP (OUTPATIENT)
Dept: INTERNAL MEDICINE | Facility: CLINIC | Age: 60
End: 2025-05-28

## 2025-05-28 DIAGNOSIS — E83.42 HYPOMAGNESEMIA: ICD-10-CM

## 2025-05-28 LAB
ALBUMIN SERPL-MCNC: 3.8 G/DL (ref 3.5–5)
ALBUMIN/GLOB SERPL: 1.3 RATIO (ref 1.1–2)
ALP SERPL-CCNC: 83 UNIT/L (ref 40–150)
ALT SERPL-CCNC: 17 UNIT/L (ref 0–55)
ANION GAP SERPL CALC-SCNC: 9 MEQ/L
AST SERPL-CCNC: 16 UNIT/L (ref 11–45)
BILIRUB SERPL-MCNC: 0.4 MG/DL
BUN SERPL-MCNC: 15.2 MG/DL (ref 9.8–20.1)
CALCIUM SERPL-MCNC: 9.9 MG/DL (ref 8.4–10.2)
CHLORIDE SERPL-SCNC: 104 MMOL/L (ref 98–107)
CO2 SERPL-SCNC: 28 MMOL/L (ref 22–29)
CREAT SERPL-MCNC: 0.85 MG/DL (ref 0.55–1.02)
CREAT/UREA NIT SERPL: 18
EST. AVERAGE GLUCOSE BLD GHB EST-MCNC: 171.4 MG/DL
GFR SERPLBLD CREATININE-BSD FMLA CKD-EPI: >60 ML/MIN/1.73/M2
GLOBULIN SER-MCNC: 2.9 GM/DL (ref 2.4–3.5)
GLUCOSE SERPL-MCNC: 168 MG/DL (ref 74–100)
HBA1C MFR BLD: 7.6 %
MAGNESIUM SERPL-MCNC: 1.5 MG/DL (ref 1.6–2.6)
POTASSIUM SERPL-SCNC: 4.1 MMOL/L (ref 3.5–5.1)
PROT SERPL-MCNC: 6.7 GM/DL (ref 6.4–8.3)
SODIUM SERPL-SCNC: 141 MMOL/L (ref 136–145)

## 2025-05-28 PROCEDURE — 80053 COMPREHEN METABOLIC PANEL: CPT

## 2025-05-28 PROCEDURE — 36415 COLL VENOUS BLD VENIPUNCTURE: CPT

## 2025-05-28 PROCEDURE — 83735 ASSAY OF MAGNESIUM: CPT

## 2025-05-28 PROCEDURE — 83036 HEMOGLOBIN GLYCOSYLATED A1C: CPT

## 2025-06-02 ENCOUNTER — OFFICE VISIT (OUTPATIENT)
Dept: INTERNAL MEDICINE | Facility: CLINIC | Age: 60
End: 2025-06-02
Payer: OTHER GOVERNMENT

## 2025-06-02 VITALS
BODY MASS INDEX: 30.14 KG/M2 | SYSTOLIC BLOOD PRESSURE: 138 MMHG | RESPIRATION RATE: 16 BRPM | HEIGHT: 65 IN | WEIGHT: 180.88 LBS | OXYGEN SATURATION: 100 % | HEART RATE: 75 BPM | DIASTOLIC BLOOD PRESSURE: 88 MMHG | TEMPERATURE: 98 F

## 2025-06-02 DIAGNOSIS — M10.9 GOUT, UNSPECIFIED CAUSE, UNSPECIFIED CHRONICITY, UNSPECIFIED SITE: ICD-10-CM

## 2025-06-02 DIAGNOSIS — I10 PRIMARY HYPERTENSION: Primary | Chronic | ICD-10-CM

## 2025-06-02 DIAGNOSIS — E11.65 TYPE 2 DIABETES MELLITUS WITH HYPERGLYCEMIA, WITHOUT LONG-TERM CURRENT USE OF INSULIN: Chronic | ICD-10-CM

## 2025-06-02 DIAGNOSIS — Z12.11 SCREENING FOR COLORECTAL CANCER: ICD-10-CM

## 2025-06-02 DIAGNOSIS — Z00.00 WELL ADULT EXAM: ICD-10-CM

## 2025-06-02 DIAGNOSIS — E87.6 HYPOKALEMIA: ICD-10-CM

## 2025-06-02 DIAGNOSIS — E78.2 MIXED HYPERLIPIDEMIA: Chronic | ICD-10-CM

## 2025-06-02 DIAGNOSIS — E83.42 HYPOMAGNESEMIA: ICD-10-CM

## 2025-06-02 DIAGNOSIS — K21.9 GASTROESOPHAGEAL REFLUX DISEASE WITHOUT ESOPHAGITIS: Chronic | ICD-10-CM

## 2025-06-02 DIAGNOSIS — Z12.12 SCREENING FOR COLORECTAL CANCER: ICD-10-CM

## 2025-06-02 PROCEDURE — 99214 OFFICE O/P EST MOD 30 MIN: CPT | Mod: PBBFAC

## 2025-06-02 PROCEDURE — 99214 OFFICE O/P EST MOD 30 MIN: CPT | Mod: S$PBB,,,

## 2025-06-02 PROCEDURE — G2211 COMPLEX E/M VISIT ADD ON: HCPCS | Mod: S$PBB,,,

## 2025-06-02 RX ORDER — SIMVASTATIN 20 MG/1
20 TABLET, FILM COATED ORAL NIGHTLY
Qty: 90 TABLET | Refills: 3 | Status: SHIPPED | OUTPATIENT
Start: 2025-06-02 | End: 2026-06-02

## 2025-06-02 RX ORDER — METFORMIN HYDROCHLORIDE 1000 MG/1
1000 TABLET ORAL 2 TIMES DAILY
Qty: 180 TABLET | Refills: 2 | Status: SHIPPED | OUTPATIENT
Start: 2025-06-02 | End: 2026-02-27

## 2025-06-02 RX ORDER — FAMOTIDINE 40 MG/1
40 TABLET, FILM COATED ORAL DAILY
Qty: 90 TABLET | Refills: 2 | Status: SHIPPED | OUTPATIENT
Start: 2025-06-02 | End: 2026-02-27

## 2025-06-02 RX ORDER — HYDROCHLOROTHIAZIDE 25 MG/1
25 TABLET ORAL DAILY
Qty: 90 TABLET | Refills: 2 | Status: SHIPPED | OUTPATIENT
Start: 2025-06-02 | End: 2026-02-27

## 2025-06-02 RX ORDER — LOSARTAN POTASSIUM 50 MG/1
50 TABLET ORAL DAILY
Qty: 90 TABLET | Refills: 2 | Status: SHIPPED | OUTPATIENT
Start: 2025-06-02 | End: 2026-02-27

## 2025-06-02 RX ORDER — POTASSIUM CHLORIDE 20 MEQ/1
40 TABLET, EXTENDED RELEASE ORAL DAILY
Qty: 180 TABLET | Refills: 2 | Status: SHIPPED | OUTPATIENT
Start: 2025-06-02 | End: 2026-02-27

## 2025-06-02 RX ORDER — OMEPRAZOLE 40 MG/1
40 CAPSULE, DELAYED RELEASE ORAL EVERY MORNING
Qty: 90 CAPSULE | Refills: 2 | Status: SHIPPED | OUTPATIENT
Start: 2025-06-02 | End: 2026-02-27

## 2025-06-02 RX ORDER — ALLOPURINOL 100 MG/1
100 TABLET ORAL DAILY
Qty: 90 TABLET | Refills: 2 | Status: SHIPPED | OUTPATIENT
Start: 2025-06-02 | End: 2026-02-27

## 2025-07-15 ENCOUNTER — PATIENT MESSAGE (OUTPATIENT)
Facility: CLINIC | Age: 60
End: 2025-07-15
Payer: OTHER GOVERNMENT